# Patient Record
Sex: FEMALE | Race: WHITE | NOT HISPANIC OR LATINO | Employment: FULL TIME | URBAN - METROPOLITAN AREA
[De-identification: names, ages, dates, MRNs, and addresses within clinical notes are randomized per-mention and may not be internally consistent; named-entity substitution may affect disease eponyms.]

---

## 2017-02-23 RX ORDER — PANTOPRAZOLE SODIUM 40 MG/1
TABLET, DELAYED RELEASE ORAL
Qty: 30 TABLET | Refills: 11 | Status: SHIPPED | OUTPATIENT
Start: 2017-02-23 | End: 2018-02-26 | Stop reason: SDUPTHER

## 2018-02-27 RX ORDER — PANTOPRAZOLE SODIUM 40 MG/1
TABLET, DELAYED RELEASE ORAL
Qty: 90 TABLET | Refills: 3 | Status: SHIPPED | OUTPATIENT
Start: 2018-02-27 | End: 2018-11-12 | Stop reason: SDUPTHER

## 2018-11-12 RX ORDER — PANTOPRAZOLE SODIUM 40 MG/1
TABLET, DELAYED RELEASE ORAL
Qty: 90 TABLET | Refills: 3 | Status: SHIPPED | OUTPATIENT
Start: 2018-11-12 | End: 2019-11-25 | Stop reason: SDUPTHER

## 2018-12-10 ENCOUNTER — TELEPHONE (OUTPATIENT)
Dept: GASTROENTEROLOGY | Facility: CLINIC | Age: 63
End: 2018-12-10

## 2018-12-10 NOTE — TELEPHONE ENCOUNTER
----- Message from Leah Randolph sent at 12/10/2018  8:33 AM CST -----  Contact: pt  ..Type: Needs Medical Advice    Who Called: pt  Best Call Back Number:   Additional Information: Pt would like to speak with a nurse in regards to her past  Bladder. surgery, need to know dr's name that did surgery.  Please call to advise  Thanks

## 2019-12-01 RX ORDER — PANTOPRAZOLE SODIUM 40 MG/1
TABLET, DELAYED RELEASE ORAL
Qty: 90 TABLET | Refills: 3 | Status: SHIPPED | OUTPATIENT
Start: 2019-12-01 | End: 2020-08-24

## 2020-03-15 ENCOUNTER — NURSE TRIAGE (OUTPATIENT)
Dept: ADMINISTRATIVE | Facility: CLINIC | Age: 65
End: 2020-03-15

## 2020-03-16 NOTE — TELEPHONE ENCOUNTER
Patient attempted [facility] but after holding on for more than and hour she states the doctor stop taking calls and the line disconnected. She would like if Malick Og would please call her in the AM because the medications are not working    Reason for Disposition   [1] Follow-up call to recent contact AND [2] information only call, no triage required    Additional Information   Negative: [1] Caller is not with the adult (patient) AND [2] reporting urgent symptoms   Negative: Lab result questions   Negative: Medication questions   Negative: Caller can't be reached by phone   Negative: Caller has already spoken to PCP or another triager   Negative: RN needs further essential information from caller in order to complete triage   Negative: Requesting regular office appointment   Negative: [1] Caller requesting NON-URGENT health information AND [2] PCP's office is the best resource   Negative: Health Information question, no triage required and triager able to answer question   Negative: General information question, no triage required and triager able to answer question   Negative: Question about upcoming scheduled test, no triage required and triager able to answer question   Negative: [1] Caller is not with the adult (patient) AND [2] probable NON-URGENT symptoms    Protocols used: INFORMATION ONLY CALL-A-

## 2020-03-16 NOTE — TELEPHONE ENCOUNTER
"Pt calling, states she was recently tested for Mono on Wednesday and has since gotten much worse. Her results for her EB virus test were released to her on the portal but no one called her to discuss. Pt is calling stating that she has 8/10 headache that is getting worse each day and is only mildly controlled with Fioricet. Per triage protocol, advised that she use [facility] within 4 hours. Daughter and patient verbalized understanding.    Reason for Disposition   [1] SEVERE headache (e.g., excruciating) AND [2] not improved after 2 hours of pain medicine    Additional Information   Negative: Difficult to awaken or acting confused (e.g., disoriented, slurred speech)   Negative: [1] Weakness of the face, arm or leg on one side of the body AND [2] new onset   Negative: [1] Numbness of the face, arm or leg on one side of the body AND [2] new onset   Negative: [1] Loss of speech or garbled speech AND [2] new onset   Negative: Passed out (i.e., lost consciousness, collapsed and was not responding)   Negative: Stiff neck (can't touch chin to chest)   Negative: Unable to walk, or can only walk with assistance (e.g., requires support)   Negative: Severe pain in one eye   Negative: [1] Other family members (or roommates) with headaches AND [2] possibility of carbon monoxide exposure   Negative: [1] SEVERE headache (e.g., excruciating) AND [2] "worst headache" of life   Negative: [1] SEVERE headache AND [2] sudden-onset (i.e., reaching maximum intensity within seconds)   Negative: [1] SEVERE headache AND [2] fever   Negative: Loss of vision or double vision (Exception: same as prior migraines)   Negative: [1] Fever > 100.0 F (37.8 C) AND [2] diabetes mellitus or weak immune system (e.g., HIV positive, cancer chemo, splenectomy, chronic steroids)   Negative: Patient sounds very sick or weak to the triager    Protocols used: HEADACHE-A-AH      "

## 2020-05-03 ENCOUNTER — OFFICE VISIT (OUTPATIENT)
Dept: URGENT CARE | Facility: CLINIC | Age: 65
End: 2020-05-03
Payer: COMMERCIAL

## 2020-05-03 ENCOUNTER — NURSE TRIAGE (OUTPATIENT)
Dept: ADMINISTRATIVE | Facility: CLINIC | Age: 65
End: 2020-05-03

## 2020-05-03 VITALS — TEMPERATURE: 97 F | HEART RATE: 77 BPM | OXYGEN SATURATION: 98 %

## 2020-05-03 DIAGNOSIS — Z20.822 EXPOSURE TO COVID-19 VIRUS: Primary | ICD-10-CM

## 2020-05-03 DIAGNOSIS — R50.9 FEVER, UNSPECIFIED FEVER CAUSE: ICD-10-CM

## 2020-05-03 PROCEDURE — 99214 PR OFFICE/OUTPT VISIT, EST, LEVL IV, 30-39 MIN: ICD-10-PCS | Mod: S$GLB,,, | Performed by: NURSE PRACTITIONER

## 2020-05-03 PROCEDURE — 99214 OFFICE O/P EST MOD 30 MIN: CPT | Mod: S$GLB,,, | Performed by: NURSE PRACTITIONER

## 2020-05-03 PROCEDURE — U0002 COVID-19 LAB TEST NON-CDC: HCPCS

## 2020-05-03 NOTE — TELEPHONE ENCOUNTER
Huyen is wanting a test for covid.  Her family has been exposed and she has symptoms  Heaviness in chest joint pain and low grade temp  She has been taking tylenol

## 2020-05-03 NOTE — TELEPHONE ENCOUNTER
Additional Information   Negative: [1] Fever (or feeling feverish) OR cough AND [2] within 14 Days of COVID-19 EXPOSURE (Close Contact)   Negative: [1] Fever (or feeling feverish) OR cough occurs AND [2] within 14 days of travel from another country (international travel)   Negative: [1] Fever (or feeling feverish) OR cough occurs AND [2] within 14 days of travel from a city or area with major community spread   Negative: [1] Fever (or feeling feverish) OR cough occurs AND [2] living in area with major community spread AND [3] testing being done in the community for symptoms   Negative: [1] Mild body aches, chills, diarrhea, headache, runny nose, or sore throat AND [2] within 14 days of COVID-19 EXPOSURE   Negative: [1] COVID-19 EXPOSURE within last 14 days AND [2] NO cough, fever, or breathing difficulty AND [3] exposed person is a healthcare worker who was NOT using all recommended personal protective equipment (i.e., a respirator-N95 mask, eye protection, gloves, and gown)    Protocols used: CORONAVIRUS (COVID-19) EXPOSURE-A-

## 2020-05-03 NOTE — PATIENT INSTRUCTIONS
We will call with results.  Continue self quarantine until results and based on symptoms per cdc guidelines

## 2020-05-03 NOTE — TELEPHONE ENCOUNTER
Patient wants to be tested with her grandson, they are obtaining order for him, she was tested in March and was negative, Virtual appt offered as she wants to go now with him, unsure if the testing site her grandson will go to will test all family members,

## 2020-05-03 NOTE — TELEPHONE ENCOUNTER
At urgent care and has been exposed      Reason for Disposition   [1] Fever (or feeling feverish) OR cough AND [2] within 14 Days of COVID-19 EXPOSURE (Close Contact)    Additional Information   Negative: SEVERE difficulty breathing (e.g., struggling for each breath, speaks in single words)   Negative: Bluish (or gray) lips or face now   Negative: Sounds like a life-threatening emergency to the triager   Negative: [1] Difficulty breathing occurs AND [2] within 14 days of COVID-19 EXPOSURE (Close Contact)   Negative: Patient sounds very sick or weak to the triager    Protocols used: CORONAVIRUS (COVID-19) EXPOSURE-A-AH

## 2020-05-03 NOTE — PROGRESS NOTES
Subjective:       Patient ID: Huyen Gracia is a 64 y.o. female.    Vitals:  temperature is 97.1 °F (36.2 °C). Her pulse is 77. Her oxygen saturation is 98%.     Chief Complaint: Fever    Symptoms began last week.   Pt took tylenol and fiorecet for symptoms for the past week.  Reports back of neck headache, joint pain, heavy chest/sob at times - denies coughing, denies asthma, copd. Denies fevers at this time. Reports tolerating food and drink. Reports working outside/cutting grass in the heat. Daughter and grandson with viral symptoms and daughter working outside house and positive exposure to covid.     Cough   This is a new problem. The current episode started in the past 7 days. The problem has been gradually worsening. The problem occurs every few minutes. The cough is non-productive. Associated symptoms include headaches, myalgias, a sore throat and shortness of breath. Pertinent negatives include no chest pain, chills, ear congestion, ear pain, eye redness, fever, heartburn, hemoptysis, nasal congestion, postnasal drip, rash, rhinorrhea, sweats, weight loss or wheezing. Nothing aggravates the symptoms. She has tried nothing for the symptoms. The treatment provided no relief. There is no history of asthma, bronchiectasis, bronchitis, COPD, emphysema, environmental allergies or pneumonia.       Constitution: Positive for fatigue. Negative for chills, sweating and fever.   HENT: Positive for sore throat. Negative for ear pain, postnasal drip, sinus pain, sinus pressure and voice change.    Neck: Positive for neck stiffness. Negative for painful lymph nodes.   Cardiovascular: Negative for chest pain.   Eyes: Negative for eye redness.   Respiratory: Positive for chest tightness, cough and shortness of breath. Negative for sputum production, bloody sputum, COPD, stridor, wheezing and asthma.    Gastrointestinal: Negative for heartburn.   Musculoskeletal: Positive for muscle ache.   Skin: Negative for rash.    Allergic/Immunologic: Negative for environmental allergies, seasonal allergies and asthma.   Neurological: Positive for dizziness and headaches.   Hematologic/Lymphatic: Negative for swollen lymph nodes.       Objective:      Physical Exam    Patient was seen remotely due to state of emergency for the COVID-19 outbreak.    Assessment:       1. Exposure to Covid-19 Virus    2. Fever, unspecified fever cause        Plan:     discussed rebound headaches - avoid fioricet. Tylenol for headache, rest/hydrate.   Meets criteria for covid 19 testing- subjective fever, exposure to covid19.   Self quarantine until results and based on cdc guidelines.  ER precautions.    Exposure to Covid-19 Virus  -     COVID-19 Routine Screening    Fever, unspecified fever cause  -     COVID-19 Routine Screening      Patient Instructions   We will call with results.  Continue self quarantine until results and based on symptoms per cdc guidelines

## 2020-05-03 NOTE — TELEPHONE ENCOUNTER
Reason for Disposition   [1] COVID-19 EXPOSURE (Close Contact) within last 14 days AND [2] NO cough, fever, or breathing difficulty    Additional Information   Negative: SEVERE difficulty breathing (e.g., struggling for each breath, speaks in single words)   Negative: Bluish (or gray) lips or face now   Negative: Sounds like a life-threatening emergency to the triager   Negative: [1] Adult has symptoms of COVID-19 (fever, cough, or SOB) AND [2] lab test positive   Negative: [1] Adult has symptoms of COVID-19 (fever, cough or SOB) AND [2] major community spread where patient lives AND [3] testing not being done for mild symptoms   Negative: [1] Difficulty breathing (shortness of breath) occurs AND [2] onset > 14 days after COVID-19 EXPOSURE (Close Contact) AND [3] no major community spread   Negative: [1] Dry cough occurs AND [2] onset > 14 days after COVID-19 EXPOSURE AND [3] no major community spread   Negative: [1] Wet cough (i.e., white-yellow, yellow, green, or mali colored sputum) AND [2] onset > 14 days after COVID-19 EXPOSURE AND [3] no major community spread   Negative: [1] Common cold symptoms AND [2] onset > 14 days after COVID-19 EXPOSURE AND [3] no major community spread   Negative: [1] Difficulty breathing occurs AND [2] within 14 days of COVID-19 EXPOSURE (Close Contact)   Negative: Patient sounds very sick or weak to the triager    Protocols used: [facility]

## 2020-05-05 ENCOUNTER — TELEPHONE (OUTPATIENT)
Dept: URGENT CARE | Facility: CLINIC | Age: 65
End: 2020-05-05

## 2020-05-05 LAB — SARS-COV-2 RNA RESP QL NAA+PROBE: NOT DETECTED

## 2020-05-05 NOTE — TELEPHONE ENCOUNTER
Spoke with patient's daughter regarding negative Covid-19 testing. The patient is feeling much better. Discussed to practice safe social distancing and good hand hygiene. They did not contract the coronavirus at this point however that does not mean they are not susceptible to it in the future. Patient's daughter verbalized understanding and all of their questions were answered.

## 2021-04-28 PROBLEM — R00.1 BRADYCARDIA: Status: ACTIVE | Noted: 2021-04-28

## 2021-05-10 ENCOUNTER — PATIENT MESSAGE (OUTPATIENT)
Dept: RESEARCH | Facility: HOSPITAL | Age: 66
End: 2021-05-10

## 2022-01-11 ENCOUNTER — OFFICE VISIT (OUTPATIENT)
Dept: FAMILY MEDICINE | Facility: CLINIC | Age: 67
End: 2022-01-11
Payer: COMMERCIAL

## 2022-01-11 VITALS
DIASTOLIC BLOOD PRESSURE: 70 MMHG | TEMPERATURE: 98 F | BODY MASS INDEX: 39 KG/M2 | HEART RATE: 66 BPM | SYSTOLIC BLOOD PRESSURE: 118 MMHG | HEIGHT: 59 IN | WEIGHT: 193.44 LBS | OXYGEN SATURATION: 98 %

## 2022-01-11 DIAGNOSIS — Z11.4 SCREENING FOR HIV WITHOUT PRESENCE OF RISK FACTORS: ICD-10-CM

## 2022-01-11 DIAGNOSIS — I10 PRIMARY HYPERTENSION: Primary | ICD-10-CM

## 2022-01-11 DIAGNOSIS — Z11.59 ENCOUNTER FOR HEPATITIS C SCREENING TEST FOR LOW RISK PATIENT: ICD-10-CM

## 2022-01-11 LAB
ALBUMIN SERPL BCP-MCNC: 3.8 G/DL (ref 3.5–5.2)
ALP SERPL-CCNC: 156 U/L (ref 55–135)
ALT SERPL W/O P-5'-P-CCNC: 13 U/L (ref 10–44)
ANION GAP SERPL CALC-SCNC: 11 MMOL/L (ref 8–16)
AST SERPL-CCNC: 17 U/L (ref 10–40)
BASOPHILS # BLD AUTO: 0.04 K/UL (ref 0–0.2)
BASOPHILS NFR BLD: 0.5 % (ref 0–1.9)
BILIRUB SERPL-MCNC: 0.3 MG/DL (ref 0.1–1)
BUN SERPL-MCNC: 12 MG/DL (ref 8–23)
CALCIUM SERPL-MCNC: 9.8 MG/DL (ref 8.7–10.5)
CHLORIDE SERPL-SCNC: 102 MMOL/L (ref 95–110)
CHOLEST SERPL-MCNC: 201 MG/DL (ref 120–199)
CHOLEST/HDLC SERPL: 4.4 {RATIO} (ref 2–5)
CO2 SERPL-SCNC: 24 MMOL/L (ref 23–29)
CREAT SERPL-MCNC: 0.9 MG/DL (ref 0.5–1.4)
DIFFERENTIAL METHOD: ABNORMAL
EOSINOPHIL # BLD AUTO: 0.3 K/UL (ref 0–0.5)
EOSINOPHIL NFR BLD: 3.2 % (ref 0–8)
ERYTHROCYTE [DISTWIDTH] IN BLOOD BY AUTOMATED COUNT: 13.4 % (ref 11.5–14.5)
EST. GFR  (AFRICAN AMERICAN): >60 ML/MIN/1.73 M^2
EST. GFR  (NON AFRICAN AMERICAN): >60 ML/MIN/1.73 M^2
GLUCOSE SERPL-MCNC: 96 MG/DL (ref 70–110)
HCT VFR BLD AUTO: 40.5 % (ref 37–48.5)
HDLC SERPL-MCNC: 46 MG/DL (ref 40–75)
HDLC SERPL: 22.9 % (ref 20–50)
HGB BLD-MCNC: 13 G/DL (ref 12–16)
IMM GRANULOCYTES # BLD AUTO: 0.02 K/UL (ref 0–0.04)
IMM GRANULOCYTES NFR BLD AUTO: 0.2 % (ref 0–0.5)
LDLC SERPL CALC-MCNC: 113 MG/DL (ref 63–159)
LYMPHOCYTES # BLD AUTO: 2.6 K/UL (ref 1–4.8)
LYMPHOCYTES NFR BLD: 31.6 % (ref 18–48)
MCH RBC QN AUTO: 26.9 PG (ref 27–31)
MCHC RBC AUTO-ENTMCNC: 32.1 G/DL (ref 32–36)
MCV RBC AUTO: 84 FL (ref 82–98)
MONOCYTES # BLD AUTO: 0.5 K/UL (ref 0.3–1)
MONOCYTES NFR BLD: 5.9 % (ref 4–15)
NEUTROPHILS # BLD AUTO: 4.9 K/UL (ref 1.8–7.7)
NEUTROPHILS NFR BLD: 58.6 % (ref 38–73)
NONHDLC SERPL-MCNC: 155 MG/DL
NRBC BLD-RTO: 0 /100 WBC
PLATELET # BLD AUTO: 342 K/UL (ref 150–450)
PMV BLD AUTO: 11.1 FL (ref 9.2–12.9)
POTASSIUM SERPL-SCNC: 4 MMOL/L (ref 3.5–5.1)
PROT SERPL-MCNC: 7.3 G/DL (ref 6–8.4)
RBC # BLD AUTO: 4.84 M/UL (ref 4–5.4)
SODIUM SERPL-SCNC: 137 MMOL/L (ref 136–145)
TRIGL SERPL-MCNC: 210 MG/DL (ref 30–150)
WBC # BLD AUTO: 8.33 K/UL (ref 3.9–12.7)

## 2022-01-11 PROCEDURE — 3008F PR BODY MASS INDEX (BMI) DOCUMENTED: ICD-10-PCS | Mod: CPTII,S$GLB,, | Performed by: PHYSICIAN ASSISTANT

## 2022-01-11 PROCEDURE — 87389 HIV-1 AG W/HIV-1&-2 AB AG IA: CPT | Performed by: PHYSICIAN ASSISTANT

## 2022-01-11 PROCEDURE — 99214 OFFICE O/P EST MOD 30 MIN: CPT | Mod: 25,S$GLB,, | Performed by: PHYSICIAN ASSISTANT

## 2022-01-11 PROCEDURE — 90686 FLU VACCINE (QUAD) GREATER THAN OR EQUAL TO 3YO PRESERVATIVE FREE IM: ICD-10-PCS | Mod: S$GLB,,, | Performed by: PHYSICIAN ASSISTANT

## 2022-01-11 PROCEDURE — 80061 LIPID PANEL: CPT | Performed by: PHYSICIAN ASSISTANT

## 2022-01-11 PROCEDURE — 85025 COMPLETE CBC W/AUTO DIFF WBC: CPT | Performed by: PHYSICIAN ASSISTANT

## 2022-01-11 PROCEDURE — 3074F PR MOST RECENT SYSTOLIC BLOOD PRESSURE < 130 MM HG: ICD-10-PCS | Mod: CPTII,S$GLB,, | Performed by: PHYSICIAN ASSISTANT

## 2022-01-11 PROCEDURE — 1159F PR MEDICATION LIST DOCUMENTED IN MEDICAL RECORD: ICD-10-PCS | Mod: CPTII,S$GLB,, | Performed by: PHYSICIAN ASSISTANT

## 2022-01-11 PROCEDURE — 3078F PR MOST RECENT DIASTOLIC BLOOD PRESSURE < 80 MM HG: ICD-10-PCS | Mod: CPTII,S$GLB,, | Performed by: PHYSICIAN ASSISTANT

## 2022-01-11 PROCEDURE — 3074F SYST BP LT 130 MM HG: CPT | Mod: CPTII,S$GLB,, | Performed by: PHYSICIAN ASSISTANT

## 2022-01-11 PROCEDURE — 90471 IMMUNIZATION ADMIN: CPT | Mod: S$GLB,,, | Performed by: PHYSICIAN ASSISTANT

## 2022-01-11 PROCEDURE — 86803 HEPATITIS C AB TEST: CPT | Performed by: PHYSICIAN ASSISTANT

## 2022-01-11 PROCEDURE — 1160F PR REVIEW ALL MEDS BY PRESCRIBER/CLIN PHARMACIST DOCUMENTED: ICD-10-PCS | Mod: CPTII,S$GLB,, | Performed by: PHYSICIAN ASSISTANT

## 2022-01-11 PROCEDURE — 90471 FLU VACCINE (QUAD) GREATER THAN OR EQUAL TO 3YO PRESERVATIVE FREE IM: ICD-10-PCS | Mod: S$GLB,,, | Performed by: PHYSICIAN ASSISTANT

## 2022-01-11 PROCEDURE — 80053 COMPREHEN METABOLIC PANEL: CPT | Performed by: PHYSICIAN ASSISTANT

## 2022-01-11 PROCEDURE — 1160F RVW MEDS BY RX/DR IN RCRD: CPT | Mod: CPTII,S$GLB,, | Performed by: PHYSICIAN ASSISTANT

## 2022-01-11 PROCEDURE — 3078F DIAST BP <80 MM HG: CPT | Mod: CPTII,S$GLB,, | Performed by: PHYSICIAN ASSISTANT

## 2022-01-11 PROCEDURE — 36415 COLL VENOUS BLD VENIPUNCTURE: CPT | Performed by: PHYSICIAN ASSISTANT

## 2022-01-11 PROCEDURE — 99214 PR OFFICE/OUTPT VISIT, EST, LEVL IV, 30-39 MIN: ICD-10-PCS | Mod: 25,S$GLB,, | Performed by: PHYSICIAN ASSISTANT

## 2022-01-11 PROCEDURE — 1159F MED LIST DOCD IN RCRD: CPT | Mod: CPTII,S$GLB,, | Performed by: PHYSICIAN ASSISTANT

## 2022-01-11 PROCEDURE — 84443 ASSAY THYROID STIM HORMONE: CPT | Performed by: PHYSICIAN ASSISTANT

## 2022-01-11 PROCEDURE — 90686 IIV4 VACC NO PRSV 0.5 ML IM: CPT | Mod: S$GLB,,, | Performed by: PHYSICIAN ASSISTANT

## 2022-01-11 PROCEDURE — 3008F BODY MASS INDEX DOCD: CPT | Mod: CPTII,S$GLB,, | Performed by: PHYSICIAN ASSISTANT

## 2022-01-11 RX ORDER — MELOXICAM 7.5 MG/1
7.5 TABLET ORAL DAILY
COMMUNITY

## 2022-01-11 RX ORDER — CYCLOBENZAPRINE HCL 10 MG
10 TABLET ORAL 3 TIMES DAILY PRN
COMMUNITY

## 2022-01-11 RX ORDER — BACLOFEN 10 MG/1
10 TABLET ORAL 3 TIMES DAILY PRN
COMMUNITY

## 2022-01-11 NOTE — PROGRESS NOTES
"Subjective:       Patient ID: Huyen Gracia is a 66 y.o. female.    Chief Complaint: Establish Care    Patient is a 67 yo female coming in to establish care with me. She voices no acute complaints.     Patient Active Problem List:     Dysphagia     GERD (gastroesophageal reflux disease)     HBP (high blood pressure)     Bradycardia  CONDITIONS ARE STABLE ON MEDICATION.     Past Medical History:  No date: Dysphagia  No date: General anesthetics causing adverse effect in therapeutic use      Comment:  No Versed/ Pt states "fights" going in and out  No date: GERD (gastroesophageal reflux disease)  No date: History of 2019 novel coronavirus disease (COVID-19)  No date: Hypertension  No date: Lumbar disc disease    Past Surgical History:  No date: APPENDECTOMY  No date: BACK SURGERY      Comment:  "shaved" herniated discs  No date: BACK SURGERY      Comment:  lumber fusion  No date: CHOLECYSTECTOMY  No date: COLONOSCOPY  6/25/2012  [facility]: COLONOSCOPY W/ POLYPECTOMY      Comment:  One 1 mm polyp at the hepatic flexure.  TUBULAR                ADENOMATOUS POLYP.  The entire examined colon is                otherwise normal.  No date: HYSTERECTOMY  No date: KIDNEY SURGERY      Comment:  Stent   No date: TUBAL LIGATION  6/24/2008  Zac: UPPER GASTROINTESTINAL ENDOSCOPY      Comment:  LA Grade A reflux esophagitis.  Esophagus dilated - 51                Fr.  Minimal antritis.  TRAVIS Test  Negative.   6/25/2012  Z[facility]: UPPER GASTROINTESTINAL ENDOSCOPY      Comment:  Normal esophagus.  Normal stomach and duodenum.  TRAVIS                Test  Negative.    No family history on file.      Social History    Socioeconomic History      Marital status: Single    Tobacco Use      Smoking status: Never Smoker      Smokeless tobacco: Never Used    Substance and Sexual Activity      Alcohol use: Yes        Alcohol/week: 1.0 standard drink        Types: 1 Glasses of wine per week      Drug use: No      Review of patient's allergies " indicates:   -- Morphine -- Other (See Comments)    --  Hallucinations   -- Penicillins -- Hives   -- Sulfa (sulfonamide antibiotics) -- Hives    Current Outpatient Medications:   baclofen (LIORESAL) 10 MG tablet, Take 10 mg by mouth 3 (three) times daily., Disp: , Rfl:   butalbital-acetaminophen-caffeine -40 mg (FIORICET, ESGIC) -40 mg per tablet, TAKE 1 TABLET BY MOUTH 3 TIMES A DAY AS NEEDED **MUST LAST 30 DAYS**, Disp: 60 tablet, Rfl: 2  cetirizine (ZYRTEC) 10 MG tablet, Take 10 mg by mouth once daily., Disp: , Rfl:   cloNIDine (CATAPRES) 0.1 MG tablet, Take 1 tablet by mouth every evening., Disp: 30 tablet, Rfl: 11  cyclobenzaprine (FLEXERIL) 10 MG tablet, Take 10 mg by mouth 3 (three) times daily as needed for Muscle spasms., Disp: , Rfl:   dicyclomine (BENTYL) 20 mg tablet, Take 1 tablet by mouth every 6 hours., Disp: 120 tablet, Rfl: 3  diphenhydrAMINE (BENADRYL) 50 MG capsule, Take 50 mg by mouth nightly as needed for Insomnia., Disp: , Rfl:   diphenoxylate-atropine 2.5-0.025 mg (LOMOTIL) 2.5-0.025 mg per tablet, TAKE ONE TABLET BY MOUTH FOUR TIMES DAILY AS NEEDED FOR DIARRHEA, Disp: 28 tablet, Rfl: 1  enalapril (VASOTEC) 2.5 MG tablet, TAKE 1 TABLET (2.5 MG TOTAL) BY MOUTH ONCE DAILY., Disp: 90 tablet, Rfl: 3  estradiol (ESTRACE) 1 MG tablet, Take 2 mg by mouth once daily., Disp: , Rfl: 11  felodipine (PLENDIL) 10 MG 24 hr tablet, Take 1 tablet (10 mg total) by mouth once daily., Disp: 30 tablet, Rfl: 11  fluocinonide 0.05% (LIDEX) 0.05 % cream, Apply topically 2 (two) times daily., Disp: 15 g, Rfl: 1  gabapentin (NEURONTIN) 300 MG capsule, Take 600 mg by mouth 3 (three) times daily., Disp: , Rfl:   hyoscyamine (LEVSIN/SL) 0.125 mg Subl, Dissolve 1 to 2 tablets under tongue (or chew 2) every three to four hours as needed to relieve esophagus spasms., Disp: 30 tablet, Rfl: 6  melatonin 10 mg Tab, Take 1 tablet by mouth nightly., Disp: , Rfl:   meloxicam (MOBIC) 7.5 MG tablet,  "Take 7.5 mg by mouth once daily., Disp: , Rfl:   neomycin-polymyxin-hydrocortisone (CORTISPORIN) otic solution, Place 3 drops into the right ear 3 (three) times daily., Disp: 1 Bottle, Rfl: 0  ondansetron (ZOFRAN) 4 MG tablet, TAKE 1 TABLET (4 MG TOTAL) BY MOUTH EVERY 6 (SIX) HOURS AS NEEDED FOR NAUSEA., Disp: 30 tablet, Rfl: 2  pantoprazole (PROTONIX) 40 MG tablet, TAKE 1 TABLET BY MOUTH BEFORE BREAKFAST., Disp: 90 tablet, Rfl: 3  traMADol (ULTRAM) 50 mg tablet, TAKE 1 TABLET BY MOUTH 3 TIMES A DAY AS NEEDED FOR PAIN, Disp: 60 tablet, Rfl: 2  triamcinolone acetonide 0.1% (KENALOG) 0.1 % ointment, Apply topically 2 (two) times daily., Disp: 45 g, Rfl: 3  triamterene-hydrochlorothiazide 37.5-25 mg (DYAZIDE) 37.5-25 mg per capsule, TAKE 1 CAPSULE BY MOUTH EVERY MORNING., Disp: 30 capsule, Rfl: 11    /70   Pulse 66   Temp 97.5 °F (36.4 °C) (Oral)   Ht 4' 11" (1.499 m)   Wt 87.8 kg (193 lb 7.3 oz)   SpO2 98%   BMI 39.07 kg/m²       Review of Systems   Constitutional: Negative for activity change, appetite change, fatigue and fever.   HENT: Negative.    Respiratory: Negative for cough, chest tightness, shortness of breath and wheezing.    Cardiovascular: Negative for chest pain, palpitations and leg swelling.   Gastrointestinal: Negative for abdominal pain, blood in stool, diarrhea and nausea.   Genitourinary: Negative for hematuria and urgency.   Musculoskeletal: Positive for back pain.   Integumentary:  Negative for breast mass.   Neurological: Negative for dizziness, vertigo, seizures and light-headedness.   Breast: Negative for mass        Objective:      Physical Exam  Constitutional:       General: She is not in acute distress.     Appearance: Normal appearance. She is not ill-appearing, toxic-appearing or diaphoretic.   HENT:      Head: Normocephalic and atraumatic.      Right Ear: Tympanic membrane, ear canal and external ear normal. There is no impacted cerumen.      Left Ear: Tympanic " membrane, ear canal and external ear normal. There is no impacted cerumen.      Nose: Nose normal.      Mouth/Throat:      Mouth: Mucous membranes are moist.      Pharynx: No oropharyngeal exudate or posterior oropharyngeal erythema.   Eyes:      Conjunctiva/sclera: Conjunctivae normal.      Pupils: Pupils are equal, round, and reactive to light.   Neck:      Vascular: No carotid bruit.   Cardiovascular:      Rate and Rhythm: Normal rate and regular rhythm.      Pulses: Normal pulses.      Heart sounds: Normal heart sounds. No murmur heard.  No friction rub. No gallop.    Pulmonary:      Effort: Pulmonary effort is normal. No respiratory distress.      Breath sounds: Normal breath sounds. No stridor. No wheezing, rhonchi or rales.   Chest:      Chest wall: No tenderness.   Abdominal:      General: There is no distension.      Palpations: There is no mass.      Tenderness: There is no abdominal tenderness. There is no right CVA tenderness, left CVA tenderness, guarding or rebound.      Hernia: No hernia is present.   Musculoskeletal:         General: No swelling or tenderness. Normal range of motion.      Cervical back: No rigidity or tenderness.   Lymphadenopathy:      Cervical: No cervical adenopathy.   Skin:     General: Skin is warm and dry.   Neurological:      Mental Status: She is alert.   Psychiatric:         Mood and Affect: Mood normal.         Lab Results   Component Value Date    WBC 13.44 (H) 05/22/2021    HGB 13.2 05/22/2021    HCT 40.8 05/22/2021    MCV 85 05/22/2021     05/22/2021     CMP  Sodium   Date Value Ref Range Status   05/22/2021 139 136 - 145 mmol/L Final     Potassium   Date Value Ref Range Status   05/22/2021 3.5 3.5 - 5.1 mmol/L Final     Chloride   Date Value Ref Range Status   05/22/2021 101 95 - 110 mmol/L Final     CO2   Date Value Ref Range Status   05/22/2021 27 22 - 31 mmol/L Final     Glucose   Date Value Ref Range Status   05/22/2021 99 70 - 110 mg/dL Final     Comment:      The ADA recommends the following guidelines for fasting glucose:    Normal:       less than 100 mg/dL    Prediabetes:  100 mg/dL to 125 mg/dL    Diabetes:     126 mg/dL or higher       BUN   Date Value Ref Range Status   05/22/2021 12 7 - 18 mg/dL Final     Creatinine   Date Value Ref Range Status   05/22/2021 0.69 0.50 - 1.40 mg/dL Final     Calcium   Date Value Ref Range Status   05/22/2021 9.7 8.4 - 10.2 mg/dL Final     Total Protein   Date Value Ref Range Status   05/22/2021 7.7 6.0 - 8.4 g/dL Final     Albumin   Date Value Ref Range Status   05/22/2021 4.2 3.5 - 5.2 g/dL Final     Total Bilirubin   Date Value Ref Range Status   05/22/2021 0.3 0.2 - 1.3 mg/dL Final     Alkaline Phosphatase   Date Value Ref Range Status   05/22/2021 140 38 - 145 U/L Final     AST   Date Value Ref Range Status   05/22/2021 27 14 - 36 U/L Final     ALT   Date Value Ref Range Status   05/22/2021 17 0 - 35 U/L Final     Anion Gap   Date Value Ref Range Status   05/22/2021 11 8 - 16 mmol/L Final     eGFR if    Date Value Ref Range Status   05/22/2021 >60 >60 mL/min/1.73 m^2 Final     eGFR if non    Date Value Ref Range Status   05/22/2021 >60 >60 mL/min/1.73 m^2 Final     Comment:     Calculation used to obtain the estimated glomerular filtration  rate (eGFR) is the CKD-EPI equation.        Lab Results   Component Value Date    CHOL 178 03/11/2020     Lab Results   Component Value Date    HDL 52 03/11/2020     Lab Results   Component Value Date    LDLCALC 80.0 03/11/2020     Lab Results   Component Value Date    TRIG 230 (H) 03/11/2020     Lab Results   Component Value Date    CHOLHDL 29.2 03/11/2020     No results found for: LABA1C, HGBA1C  Assessment:       Problem List Items Addressed This Visit     HBP (high blood pressure) - Primary    Relevant Orders    Comprehensive Metabolic Panel    CBC Auto Differential    Lipid Panel    TSH      Other Visit Diagnoses     Screening for HIV without presence of  risk factors        Relevant Orders    HIV 1/2 Ag/Ab (4th Gen)    Encounter for hepatitis C screening test for low risk patient        Relevant Orders    Hepatitis C Antibody          Plan:       Primary hypertension  -     Comprehensive Metabolic Panel; Future; Expected date: 01/11/2022  -     CBC Auto Differential; Future; Expected date: 01/11/2022  -     Lipid Panel; Future; Expected date: 01/11/2022  -     TSH; Future; Expected date: 01/11/2022    Screening for HIV without presence of risk factors  -     HIV 1/2 Ag/Ab (4th Gen); Future; Expected date: 01/11/2022    Encounter for hepatitis C screening test for low risk patient  -     Hepatitis C Antibody; Future; Expected date: 01/11/2022    Other orders  -     Influenza - Quadrivalent (PF)

## 2022-01-12 LAB
HCV AB SERPL QL IA: NEGATIVE
HIV 1+2 AB+HIV1 P24 AG SERPL QL IA: NEGATIVE
TSH SERPL DL<=0.005 MIU/L-ACNC: 1.43 UIU/ML (ref 0.4–4)

## 2022-03-07 DIAGNOSIS — K58.9 IRRITABLE BOWEL SYNDROME WITHOUT DIARRHEA: ICD-10-CM

## 2022-03-07 RX ORDER — DICYCLOMINE HYDROCHLORIDE 20 MG/1
20 TABLET ORAL EVERY 6 HOURS
Qty: 120 TABLET | Refills: 3 | Status: SHIPPED | OUTPATIENT
Start: 2022-03-07 | End: 2023-02-27

## 2022-03-07 NOTE — TELEPHONE ENCOUNTER
Patient Requesting Refill  LOV: 1/11/22   Last fill date: 2/5/22 #30  Allergies reviewed  Medication Pended

## 2022-04-05 DIAGNOSIS — K52.9 GE (GASTROENTERITIS): ICD-10-CM

## 2022-04-05 RX ORDER — DIPHENOXYLATE HYDROCHLORIDE AND ATROPINE SULFATE 2.5; .025 MG/1; MG/1
1 TABLET ORAL 4 TIMES DAILY PRN
Qty: 45 TABLET | Refills: 1 | Status: SHIPPED | OUTPATIENT
Start: 2022-04-05 | End: 2022-06-28

## 2022-05-09 ENCOUNTER — OFFICE VISIT (OUTPATIENT)
Dept: FAMILY MEDICINE | Facility: CLINIC | Age: 67
End: 2022-05-09
Payer: COMMERCIAL

## 2022-05-09 VITALS
OXYGEN SATURATION: 98 % | WEIGHT: 193.44 LBS | HEIGHT: 59 IN | SYSTOLIC BLOOD PRESSURE: 134 MMHG | DIASTOLIC BLOOD PRESSURE: 66 MMHG | BODY MASS INDEX: 39 KG/M2 | TEMPERATURE: 98 F | HEART RATE: 78 BPM

## 2022-05-09 DIAGNOSIS — R60.0 PEDAL EDEMA: Primary | ICD-10-CM

## 2022-05-09 DIAGNOSIS — R21 RASH AND NONSPECIFIC SKIN ERUPTION: ICD-10-CM

## 2022-05-09 LAB
ALBUMIN SERPL BCP-MCNC: 3.6 G/DL (ref 3.5–5.2)
ALP SERPL-CCNC: 138 U/L (ref 55–135)
ALT SERPL W/O P-5'-P-CCNC: 9 U/L (ref 10–44)
ANION GAP SERPL CALC-SCNC: 9 MMOL/L (ref 8–16)
AST SERPL-CCNC: 14 U/L (ref 10–40)
BILIRUB SERPL-MCNC: 0.3 MG/DL (ref 0.1–1)
BUN SERPL-MCNC: 14 MG/DL (ref 8–23)
CALCIUM SERPL-MCNC: 9.2 MG/DL (ref 8.7–10.5)
CHLORIDE SERPL-SCNC: 103 MMOL/L (ref 95–110)
CO2 SERPL-SCNC: 27 MMOL/L (ref 23–29)
CREAT SERPL-MCNC: 0.8 MG/DL (ref 0.5–1.4)
EST. GFR  (AFRICAN AMERICAN): >60 ML/MIN/1.73 M^2
EST. GFR  (NON AFRICAN AMERICAN): >60 ML/MIN/1.73 M^2
GLUCOSE SERPL-MCNC: 80 MG/DL (ref 70–110)
POTASSIUM SERPL-SCNC: 4.1 MMOL/L (ref 3.5–5.1)
PROT SERPL-MCNC: 7 G/DL (ref 6–8.4)
SODIUM SERPL-SCNC: 139 MMOL/L (ref 136–145)

## 2022-05-09 PROCEDURE — 36415 COLL VENOUS BLD VENIPUNCTURE: CPT | Mod: S$GLB,,, | Performed by: INTERNAL MEDICINE

## 2022-05-09 PROCEDURE — 80053 COMPREHEN METABOLIC PANEL: CPT | Performed by: NURSE PRACTITIONER

## 2022-05-09 PROCEDURE — 3075F SYST BP GE 130 - 139MM HG: CPT | Mod: CPTII,S$GLB,, | Performed by: NURSE PRACTITIONER

## 2022-05-09 PROCEDURE — 99214 PR OFFICE/OUTPT VISIT, EST, LEVL IV, 30-39 MIN: ICD-10-PCS | Mod: S$GLB,,, | Performed by: NURSE PRACTITIONER

## 2022-05-09 PROCEDURE — 3008F PR BODY MASS INDEX (BMI) DOCUMENTED: ICD-10-PCS | Mod: CPTII,S$GLB,, | Performed by: NURSE PRACTITIONER

## 2022-05-09 PROCEDURE — 3078F DIAST BP <80 MM HG: CPT | Mod: CPTII,S$GLB,, | Performed by: NURSE PRACTITIONER

## 2022-05-09 PROCEDURE — 1159F MED LIST DOCD IN RCRD: CPT | Mod: CPTII,S$GLB,, | Performed by: NURSE PRACTITIONER

## 2022-05-09 PROCEDURE — 3078F PR MOST RECENT DIASTOLIC BLOOD PRESSURE < 80 MM HG: ICD-10-PCS | Mod: CPTII,S$GLB,, | Performed by: NURSE PRACTITIONER

## 2022-05-09 PROCEDURE — 4010F PR ACE/ARB THEARPY RXD/TAKEN: ICD-10-PCS | Mod: CPTII,S$GLB,, | Performed by: NURSE PRACTITIONER

## 2022-05-09 PROCEDURE — 1159F PR MEDICATION LIST DOCUMENTED IN MEDICAL RECORD: ICD-10-PCS | Mod: CPTII,S$GLB,, | Performed by: NURSE PRACTITIONER

## 2022-05-09 PROCEDURE — 99214 OFFICE O/P EST MOD 30 MIN: CPT | Mod: S$GLB,,, | Performed by: NURSE PRACTITIONER

## 2022-05-09 PROCEDURE — 4010F ACE/ARB THERAPY RXD/TAKEN: CPT | Mod: CPTII,S$GLB,, | Performed by: NURSE PRACTITIONER

## 2022-05-09 PROCEDURE — 3075F PR MOST RECENT SYSTOLIC BLOOD PRESS GE 130-139MM HG: ICD-10-PCS | Mod: CPTII,S$GLB,, | Performed by: NURSE PRACTITIONER

## 2022-05-09 PROCEDURE — 36415 PR COLLECTION VENOUS BLOOD,VENIPUNCTURE: ICD-10-PCS | Mod: S$GLB,,, | Performed by: INTERNAL MEDICINE

## 2022-05-09 PROCEDURE — 3008F BODY MASS INDEX DOCD: CPT | Mod: CPTII,S$GLB,, | Performed by: NURSE PRACTITIONER

## 2022-05-09 RX ORDER — CLOBETASOL PROPIONATE 0.5 MG/G
AEROSOL, FOAM TOPICAL 2 TIMES DAILY
Qty: 50 G | Refills: 1 | Status: SHIPPED | OUTPATIENT
Start: 2022-05-09 | End: 2022-05-10

## 2022-05-09 RX ORDER — FUROSEMIDE 20 MG/1
20 TABLET ORAL 2 TIMES DAILY PRN
Qty: 30 TABLET | Refills: 0 | Status: SHIPPED | OUTPATIENT
Start: 2022-05-09 | End: 2022-06-28

## 2022-05-09 NOTE — PROGRESS NOTES
Venipuncture performed with 23 gauge butterfly, x's 1 attempt,  to R Cephalic vein.  Specimens collected per orders.      Pressure dressing applied to site, instructed patient to remove dressing in 10-15 minutes, OK to re-adjust dressing if pressure causing any discomfort, to observe closely for numbness and/or discoloration to hand or fingers, and to notify provider if bleeding persists after applying constant pressure lasting 30 minutes.

## 2022-05-09 NOTE — PATIENT INSTRUCTIONS
Watch the salt in the diet.  Walk around frequently.  Consider wearing compression hose.    Double up on the lasix for 3 days.  Then return to normal daily dose.    Blood work today.  I'll let you know the results.    If you have concerns or questions, please do not hesitate to call.  If you have any questions, please do not hesitate to call.  You can reach us at [phone] Monday through Friday 7 a.m. to 6:30 p.m., Saturday 9 a.m. to 4:30 p.m.   Thank you for using the [facility]!    CHANDLER Buckley, CNP, FNP-BC [facility]    To rate your experience with JOLENE Buckley, click on the link below:      [email or web address]

## 2022-05-09 NOTE — PROGRESS NOTES
"Huyen Gracia is a 66 y.o. female patient of Dr. Demetrius De La Rosa III, PA-C who presents to the clinic today for a Virtual Visit.    HPI    Patient, who is not known to me, reports a new problem to me: left > R lower extremity swelling for the past 2 weeks..    Takes the lasix daily.  Takes 4 medications for BP.  Has had swelling with being on her feet for long periods.  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  No pain associated    These symptoms began 2 weeks  ago and status is continuing.  Goes down overnight but never resolves.  BP is controlled.    Pt denies the following symptoms:  Change in diet, change in activity    Aggravating factors include standing at a conference 2 days ago.  Stood for about 4 hrs. .    Relieving factors include somehwhat overnight. .    OTC Medications tried are no new meds..    Prescription medications taken for symptoms are lasix daily..    Pertinent medical history:  H/o HTN..    ROS    Constitutional:  No fever, no fatigue.    HEENT:  Head cold. Sneezing.    Heart:    No palpitations, no chest pain.    Lungs:   No difficulty breathing, no coughing.  GI/-no sxs.    MS:  No  change in bones, joints or muscles.    Skin:  + rash.  Ointments, steroids, Zyrtec/Pepcid--no help.  Dermatologist did shave bx-nothing identified.  Halobetasol foam has helped.      Past Medical History:   Diagnosis Date    Dysphagia     General anesthetics causing adverse effect in therapeutic use     No Versed/ Pt states "fights" going in and out    GERD (gastroesophageal reflux disease)     History of 2019 novel coronavirus disease (COVID-19)     Hypertension     Lumbar disc disease        Current Outpatient Medications:     baclofen (LIORESAL) 10 MG tablet, Take 10 mg by mouth 3 (three) times daily., Disp: , Rfl:     butalbital-acetaminophen-caffeine -40 mg (FIORICET, ESGIC) -40 mg per tablet, TAKE 1 TABLET BY MOUTH 3 TIMES A DAY AS NEEDED **MUST LAST 30 DAYS**, Disp: 60 " tablet, Rfl: 2    cloNIDine (CATAPRES) 0.1 MG tablet, TAKE ONE TABLET BY MOUTH EVERY EVENING, Disp: 30 tablet, Rfl: 11    cyclobenzaprine (FLEXERIL) 10 MG tablet, Take 10 mg by mouth 3 (three) times daily as needed for Muscle spasms., Disp: , Rfl:     dicyclomine (BENTYL) 20 mg tablet, Take 1 tablet (20 mg total) by mouth every 6 (six) hours., Disp: 120 tablet, Rfl: 3    diphenhydrAMINE (BENADRYL) 50 MG capsule, Take 50 mg by mouth nightly as needed for Insomnia., Disp: , Rfl:     diphenoxylate-atropine 2.5-0.025 mg (LOMOTIL) 2.5-0.025 mg per tablet, Take 1 tablet by mouth 4 (four) times daily as needed., Disp: 45 tablet, Rfl: 1    enalapril (VASOTEC) 2.5 MG tablet, TAKE 1 TABLET (2.5 MG TOTAL) BY MOUTH ONCE DAILY., Disp: 90 tablet, Rfl: 3    estradiol (ESTRACE) 1 MG tablet, Take 2 mg by mouth once daily., Disp: , Rfl: 11    felodipine (PLENDIL) 10 MG 24 hr tablet, Take 1 tablet (10 mg total) by mouth once daily., Disp: 30 tablet, Rfl: 11    gabapentin (NEURONTIN) 300 MG capsule, Take 600 mg by mouth 3 (three) times daily., Disp: , Rfl:     hyoscyamine (LEVSIN/SL) 0.125 mg Subl, Dissolve 1 to 2 tablets under tongue (or chew 2) every three to four hours as needed to relieve esophagus spasms., Disp: 30 tablet, Rfl: 6    melatonin 10 mg Tab, Take 1 tablet by mouth nightly., Disp: , Rfl:     meloxicam (MOBIC) 7.5 MG tablet, Take 7.5 mg by mouth once daily., Disp: , Rfl:     ondansetron (ZOFRAN) 4 MG tablet, TAKE 1 TABLET (4 MG TOTAL) BY MOUTH EVERY 6 (SIX) HOURS AS NEEDED FOR NAUSEA., Disp: 30 tablet, Rfl: 2    pantoprazole (PROTONIX) 40 MG tablet, TAKE 1 TABLET BY MOUTH BEFORE BREAKFAST., Disp: 90 tablet, Rfl: 3    traMADol (ULTRAM) 50 mg tablet, TAKE 1 TABLET BY MOUTH 3 TIMES A DAY AS NEEDED FOR PAIN, Disp: 60 tablet, Rfl: 2    triamcinolone acetonide 0.1% (KENALOG) 0.1 % ointment, Apply topically 2 (two) times daily., Disp: 45 g, Rfl: 3    triamterene-hydrochlorothiazide 37.5-25 mg (DYAZIDE) 37.5-25  "mg per capsule, TAKE 1 CAPSULE BY MOUTH EVERY MORNING., Disp: 30 capsule, Rfl: 11    cetirizine (ZYRTEC) 10 MG tablet, Take 10 mg by mouth once daily., Disp: , Rfl:     fluocinonide 0.05% (LIDEX) 0.05 % cream, Apply topically 2 (two) times daily. (Patient not taking: Reported on 5/9/2022), Disp: 15 g, Rfl: 1    neomycin-polymyxin-hydrocortisone (CORTISPORIN) otic solution, Place 3 drops into the right ear 3 (three) times daily. (Patient not taking: Reported on 5/9/2022), Disp: 1 Bottle, Rfl: 0    Patient is not a smoker.    PHYSICAL EXAM    Alert, coop 66 y.o. female patient in no acute distress, not ill appearing.    Vitals:    05/09/22 1316   BP: 134/66   Pulse: 78   Temp: 98.3 °F (36.8 °C)   TempSrc: Oral   SpO2: 98%   Weight: 87.8 kg (193 lb 7.3 oz)   Height: 4' 11" (1.499 m)     VS reviewed.  VS stable.  CC, nursing note, medications & PMH all reviewed today.    Head:  Normocephalic, atraumatic.    EENT: Ext nose/ears are without lesions or erythema.  No drainage noted.  Eyes lids symmetrical and with out lesions, conjunctivae not injected.  EOMs intact.             Resp:  Respirations even, unlabored              Lungs CTA bialt    CV:  Cap RF brisk.  No circumoral cyanosis noted.           Heart RRR, no murmur.           L>R  pedal edema noted--on left to mid-shin and on right to above ankle.  Nonpitting edema.    MS:   Ambulates independently .    NEURO:  Alert and oriented x 4.  Responds appropriately during interaction.    Skin:  Warm, dry, color good.            Small area with several erythematous papules on the right lower shin area.  No pustules.      Psych:  Responds appropriately throughout the visit.               Relaxed.  Well-groomed.    Pedal edema  -     Comprehensive Metabolic Panel; Future; Expected date: 05/09/2022  -     furosemide (LASIX) 20 MG tablet; Take 1 tablet (20 mg total) by mouth 2 (two) times daily as needed (swelling).  Dispense: 30 tablet; Refill: 0    Rash and nonspecific " skin eruption  -     clobetasoL (OLUX) 0.05 % Foam; Apply topically 2 (two) times daily.  Dispense: 50 g; Refill: 1            Pt today presents with concern for bilat, L>R, pedal edema.  Has had this in the past but worse since she was at a conference standing much of the time 2 days ago.  She does take lasix daily and her BP is controlled.  She has lowered the salt in her diet but no excluded it.  She did lose about 7 lbs.  She additionally, she has a rash on the right lower leg for some time.  Derm could not determine etiology.  However, halobetasol foam nightly helps.    Advised to take lasix extra tablets x 3 days and repeat as needed for edmea.  Limit salt in diet.  Wear compression hose.  Consider weight loss.    Pt advised to perform comfort measures recommended on patient instruction sheet, which were reviewed at the time of the visit..    If not better in 2 weeks, the patient is advised to call here, PCP office or go for an in-person/follow up evaluation.  If worse or concerns, the patient is advised to call for advise to this office or the PCP office or call [facility] or go to the nearest URGENT CARE or ER.  Explained exam findings, diagnosis and treatment plan to patient.  Questions answered and patient states understanding.

## 2022-05-10 ENCOUNTER — TELEPHONE (OUTPATIENT)
Dept: FAMILY MEDICINE | Facility: CLINIC | Age: 67
End: 2022-05-10
Payer: COMMERCIAL

## 2022-05-10 ENCOUNTER — PATIENT MESSAGE (OUTPATIENT)
Dept: FAMILY MEDICINE | Facility: CLINIC | Age: 67
End: 2022-05-10
Payer: COMMERCIAL

## 2022-05-10 DIAGNOSIS — R21 RASH AND NONSPECIFIC SKIN ERUPTION: Primary | ICD-10-CM

## 2022-05-10 RX ORDER — CLOBETASOL PROPIONATE 0.46 MG/ML
SOLUTION TOPICAL 2 TIMES DAILY
Qty: 50 ML | Refills: 1 | Status: SHIPPED | OUTPATIENT
Start: 2022-05-10

## 2022-06-28 ENCOUNTER — OFFICE VISIT (OUTPATIENT)
Dept: FAMILY MEDICINE | Facility: CLINIC | Age: 67
End: 2022-06-28
Payer: COMMERCIAL

## 2022-06-28 VITALS
WEIGHT: 193.25 LBS | DIASTOLIC BLOOD PRESSURE: 64 MMHG | BODY MASS INDEX: 39.03 KG/M2 | OXYGEN SATURATION: 98 % | SYSTOLIC BLOOD PRESSURE: 114 MMHG | HEART RATE: 77 BPM

## 2022-06-28 DIAGNOSIS — R60.0 PEDAL EDEMA: ICD-10-CM

## 2022-06-28 DIAGNOSIS — K52.9 GE (GASTROENTERITIS): ICD-10-CM

## 2022-06-28 DIAGNOSIS — Z12.11 SCREENING FOR COLON CANCER: ICD-10-CM

## 2022-06-28 DIAGNOSIS — K21.9 GASTROESOPHAGEAL REFLUX DISEASE, UNSPECIFIED WHETHER ESOPHAGITIS PRESENT: ICD-10-CM

## 2022-06-28 DIAGNOSIS — K58.9 IRRITABLE BOWEL SYNDROME, UNSPECIFIED TYPE: ICD-10-CM

## 2022-06-28 DIAGNOSIS — I10 HYPERTENSION, UNSPECIFIED TYPE: Primary | ICD-10-CM

## 2022-06-28 PROCEDURE — 4010F PR ACE/ARB THEARPY RXD/TAKEN: ICD-10-PCS | Mod: CPTII,S$GLB,, | Performed by: PHYSICIAN ASSISTANT

## 2022-06-28 PROCEDURE — 1159F MED LIST DOCD IN RCRD: CPT | Mod: CPTII,S$GLB,, | Performed by: PHYSICIAN ASSISTANT

## 2022-06-28 PROCEDURE — 99214 OFFICE O/P EST MOD 30 MIN: CPT | Mod: S$GLB,,, | Performed by: PHYSICIAN ASSISTANT

## 2022-06-28 PROCEDURE — 1159F PR MEDICATION LIST DOCUMENTED IN MEDICAL RECORD: ICD-10-PCS | Mod: CPTII,S$GLB,, | Performed by: PHYSICIAN ASSISTANT

## 2022-06-28 PROCEDURE — 3078F PR MOST RECENT DIASTOLIC BLOOD PRESSURE < 80 MM HG: ICD-10-PCS | Mod: CPTII,S$GLB,, | Performed by: PHYSICIAN ASSISTANT

## 2022-06-28 PROCEDURE — 3008F BODY MASS INDEX DOCD: CPT | Mod: CPTII,S$GLB,, | Performed by: PHYSICIAN ASSISTANT

## 2022-06-28 PROCEDURE — 3078F DIAST BP <80 MM HG: CPT | Mod: CPTII,S$GLB,, | Performed by: PHYSICIAN ASSISTANT

## 2022-06-28 PROCEDURE — 3074F PR MOST RECENT SYSTOLIC BLOOD PRESSURE < 130 MM HG: ICD-10-PCS | Mod: CPTII,S$GLB,, | Performed by: PHYSICIAN ASSISTANT

## 2022-06-28 PROCEDURE — 99214 PR OFFICE/OUTPT VISIT, EST, LEVL IV, 30-39 MIN: ICD-10-PCS | Mod: S$GLB,,, | Performed by: PHYSICIAN ASSISTANT

## 2022-06-28 PROCEDURE — 3008F PR BODY MASS INDEX (BMI) DOCUMENTED: ICD-10-PCS | Mod: CPTII,S$GLB,, | Performed by: PHYSICIAN ASSISTANT

## 2022-06-28 PROCEDURE — 4010F ACE/ARB THERAPY RXD/TAKEN: CPT | Mod: CPTII,S$GLB,, | Performed by: PHYSICIAN ASSISTANT

## 2022-06-28 PROCEDURE — 3074F SYST BP LT 130 MM HG: CPT | Mod: CPTII,S$GLB,, | Performed by: PHYSICIAN ASSISTANT

## 2022-06-28 RX ORDER — TRIAMCINOLONE ACETONIDE 1 MG/G
OINTMENT TOPICAL 2 TIMES DAILY
Qty: 45 G | Refills: 3 | Status: SHIPPED | OUTPATIENT
Start: 2022-06-28

## 2022-06-28 RX ORDER — FUROSEMIDE 20 MG/1
20 TABLET ORAL DAILY
Qty: 90 TABLET | Refills: 3 | Status: SHIPPED | OUTPATIENT
Start: 2022-06-28 | End: 2023-01-31

## 2022-06-28 RX ORDER — HYOSCYAMINE SULFATE 0.12 MG/1
TABLET SUBLINGUAL
Qty: 30 TABLET | Refills: 6 | Status: SHIPPED | OUTPATIENT
Start: 2022-06-28

## 2022-06-28 RX ORDER — DIPHENOXYLATE HYDROCHLORIDE AND ATROPINE SULFATE 2.5; .025 MG/1; MG/1
1 TABLET ORAL 4 TIMES DAILY PRN
Qty: 30 TABLET | Refills: 5 | Status: SHIPPED | OUTPATIENT
Start: 2022-06-28 | End: 2022-10-06

## 2022-06-28 NOTE — PROGRESS NOTES
"Subjective:       Patient ID: Huyen Gracia is a 66 y.o. female.    Chief Complaint: Follow-up    Patient is a 67 yo female coming in for a check up. She was seen 1 month ago for edema. She was placed on lasix, but she feels the pharmacy just gave her a refill on her dyazide.       Edema  This is a recurrent problem. The current episode started more than 1 month ago. The problem occurs daily. The problem has been waxing and waning. Pertinent negatives include no abdominal pain, chest pain, chills, fatigue, headaches or neck pain. 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 Treatments tried: avoiding salt.   Hypertension  This is a chronic problem. The current episode started more than 1 year ago. The problem is unchanged. The problem is controlled. Associated symptoms include peripheral edema. Pertinent negatives include no anxiety, blurred vision, chest pain, headaches, malaise/fatigue, neck pain, orthopnea, palpitations, PND, shortness of breath or sweats. There are no associated agents to hypertension. Risk factors for coronary artery disease include post-menopausal state. Past treatments include alpha 1 blockers, calcium channel blockers, diuretics and lifestyle changes. The current treatment provides moderate improvement.     Past Medical History:   Diagnosis Date    Dysphagia     General anesthetics causing adverse effect in therapeutic use     No Versed/ Pt states "fights" going in and out    GERD (gastroesophageal reflux disease)     History of 2019 novel coronavirus disease (COVID-19)     Hypertension     Lumbar disc disease        Review of Systems   Constitutional: Negative for activity change, chills, fatigue and malaise/fatigue.   Eyes: Negative for blurred vision.   Respiratory: Negative for chest tightness and shortness of breath.    Cardiovascular: Negative for chest pain, palpitations, orthopnea and PND.   Gastrointestinal: Negative for abdominal pain.   Musculoskeletal: Negative " for neck pain.   Neurological: Negative for headaches.         Objective:      Physical Exam  Vitals reviewed.   Constitutional:       General: She is not in acute distress.     Appearance: Normal appearance. She is not ill-appearing, toxic-appearing or diaphoretic.   HENT:      Head: Normocephalic and atraumatic.   Cardiovascular:      Rate and Rhythm: Normal rate and regular rhythm.      Pulses: Normal pulses.      Heart sounds: Normal heart sounds. No murmur heard.    No friction rub. No gallop.   Pulmonary:      Effort: Pulmonary effort is normal. No respiratory distress.      Breath sounds: Normal breath sounds. No stridor. No wheezing, rhonchi or rales.   Chest:      Chest wall: No tenderness.   Abdominal:      Tenderness: There is no abdominal tenderness.   Musculoskeletal:         General: Swelling present. No tenderness, deformity or signs of injury.      Right lower leg: No edema.      Left lower leg: No edema.      Comments: Trace pedal edema   Neurological:      Mental Status: She is alert.         Assessment:       Problem List Items Addressed This Visit     Pedal edema    Relevant Medications    furosemide (LASIX) 20 MG tablet    Other Relevant Orders    US Lower Extremity Veins Bilateral    Irritable bowel syndrome    Hypertension - Primary    GERD (gastroesophageal reflux disease)      Other Visit Diagnoses     GE (gastroenteritis)        Relevant Medications    diphenoxylate-atropine 2.5-0.025 mg (LOMOTIL) 2.5-0.025 mg per tablet    Screening for colon cancer        Relevant Orders    Cologuard Screening (Multitarget Stool DNA)          Plan:       Hypertension, unspecified type    Gastroesophageal reflux disease, unspecified whether esophagitis present    Pedal edema  -     furosemide (LASIX) 20 MG tablet; Take 1 tablet (20 mg total) by mouth once daily.  Dispense: 90 tablet; Refill: 3  -     US Lower Extremity Veins Bilateral; Future; Expected date: 06/28/2022    Irritable bowel syndrome,  unspecified type    GE (gastroenteritis)  -     diphenoxylate-atropine 2.5-0.025 mg (LOMOTIL) 2.5-0.025 mg per tablet; Take 1 tablet by mouth 4 (four) times daily as needed.  Dispense: 30 tablet; Refill: 5    Screening for colon cancer  -     Cologuard Screening (Multitarget Stool DNA); Future; Expected date: 06/28/2022    Other orders  -     triamcinolone acetonide 0.1% (KENALOG) 0.1 % ointment; Apply topically 2 (two) times daily.  Dispense: 45 g; Refill: 3  -     hyoscyamine (LEVSIN/SL) 0.125 mg Subl; Dissolve 1 to 2 tablets under tongue (or chew 2) every three to four hours as needed to relieve esophagus spasms.  Dispense: 30 tablet; Refill: 6

## 2022-07-01 ENCOUNTER — HOSPITAL ENCOUNTER (OUTPATIENT)
Dept: RADIOLOGY | Facility: HOSPITAL | Age: 67
Discharge: HOME | End: 2022-07-01
Attending: PHYSICIAN ASSISTANT
Payer: COMMERCIAL

## 2022-07-01 DIAGNOSIS — R60.0 PEDAL EDEMA: ICD-10-CM

## 2022-07-01 PROCEDURE — 93970 US LOWER EXTREMITY VEINS BILATERAL: ICD-10-PCS | Mod: 26,,, | Performed by: RADIOLOGY

## 2022-07-01 PROCEDURE — 93970 EXTREMITY STUDY: CPT | Mod: TC,PO

## 2022-07-01 PROCEDURE — 93970 EXTREMITY STUDY: CPT | Mod: 26,,, | Performed by: RADIOLOGY

## 2022-07-11 DIAGNOSIS — R19.5 POSITIVE COLORECTAL CANCER SCREENING USING COLOGUARD TEST: Primary | ICD-10-CM

## 2022-07-11 LAB — NONINV COLON CA DNA+OCC BLD SCRN STL QL: POSITIVE

## 2022-07-12 ENCOUNTER — TELEPHONE (OUTPATIENT)
Dept: FAMILY MEDICINE | Facility: CLINIC | Age: 67
End: 2022-07-12
Payer: COMMERCIAL

## 2022-07-12 ENCOUNTER — TELEPHONE (OUTPATIENT)
Dept: GASTROENTEROLOGY | Facility: CLINIC | Age: 67
End: 2022-07-12
Payer: COMMERCIAL

## 2022-07-12 NOTE — TELEPHONE ENCOUNTER
----- Message from Demetrius De La Rosa III, PA-C sent at 7/11/2022  5:09 PM CDT -----  Huyen Gracia    The screening test for colon cancer I positive. I recommend a colonoscopy further evaluated the colon.

## 2022-07-12 NOTE — TELEPHONE ENCOUNTER
Pt aware of test results, will call to schedule appt to be further evaluated, no other concerns voiced

## 2022-07-29 ENCOUNTER — OFFICE VISIT (OUTPATIENT)
Dept: FAMILY MEDICINE | Facility: CLINIC | Age: 67
End: 2022-07-29
Payer: COMMERCIAL

## 2022-07-29 VITALS
HEIGHT: 59 IN | DIASTOLIC BLOOD PRESSURE: 70 MMHG | SYSTOLIC BLOOD PRESSURE: 138 MMHG | BODY MASS INDEX: 38.88 KG/M2 | WEIGHT: 192.88 LBS | HEART RATE: 64 BPM

## 2022-07-29 DIAGNOSIS — R60.9 EDEMA, UNSPECIFIED TYPE: Primary | ICD-10-CM

## 2022-07-29 PROCEDURE — 4010F PR ACE/ARB THEARPY RXD/TAKEN: ICD-10-PCS | Mod: CPTII,S$GLB,, | Performed by: PHYSICIAN ASSISTANT

## 2022-07-29 PROCEDURE — 3078F PR MOST RECENT DIASTOLIC BLOOD PRESSURE < 80 MM HG: ICD-10-PCS | Mod: CPTII,S$GLB,, | Performed by: PHYSICIAN ASSISTANT

## 2022-07-29 PROCEDURE — 3008F PR BODY MASS INDEX (BMI) DOCUMENTED: ICD-10-PCS | Mod: CPTII,S$GLB,, | Performed by: PHYSICIAN ASSISTANT

## 2022-07-29 PROCEDURE — 99213 PR OFFICE/OUTPT VISIT, EST, LEVL III, 20-29 MIN: ICD-10-PCS | Mod: S$GLB,,, | Performed by: PHYSICIAN ASSISTANT

## 2022-07-29 PROCEDURE — 1159F PR MEDICATION LIST DOCUMENTED IN MEDICAL RECORD: ICD-10-PCS | Mod: CPTII,S$GLB,, | Performed by: PHYSICIAN ASSISTANT

## 2022-07-29 PROCEDURE — 3008F BODY MASS INDEX DOCD: CPT | Mod: CPTII,S$GLB,, | Performed by: PHYSICIAN ASSISTANT

## 2022-07-29 PROCEDURE — 3078F DIAST BP <80 MM HG: CPT | Mod: CPTII,S$GLB,, | Performed by: PHYSICIAN ASSISTANT

## 2022-07-29 PROCEDURE — 99213 OFFICE O/P EST LOW 20 MIN: CPT | Mod: S$GLB,,, | Performed by: PHYSICIAN ASSISTANT

## 2022-07-29 PROCEDURE — 3075F PR MOST RECENT SYSTOLIC BLOOD PRESS GE 130-139MM HG: ICD-10-PCS | Mod: CPTII,S$GLB,, | Performed by: PHYSICIAN ASSISTANT

## 2022-07-29 PROCEDURE — 3075F SYST BP GE 130 - 139MM HG: CPT | Mod: CPTII,S$GLB,, | Performed by: PHYSICIAN ASSISTANT

## 2022-07-29 PROCEDURE — 4010F ACE/ARB THERAPY RXD/TAKEN: CPT | Mod: CPTII,S$GLB,, | Performed by: PHYSICIAN ASSISTANT

## 2022-07-29 PROCEDURE — 1159F MED LIST DOCD IN RCRD: CPT | Mod: CPTII,S$GLB,, | Performed by: PHYSICIAN ASSISTANT

## 2022-07-29 PROCEDURE — 1160F RVW MEDS BY RX/DR IN RCRD: CPT | Mod: CPTII,S$GLB,, | Performed by: PHYSICIAN ASSISTANT

## 2022-07-29 PROCEDURE — 1160F PR REVIEW ALL MEDS BY PRESCRIBER/CLIN PHARMACIST DOCUMENTED: ICD-10-PCS | Mod: CPTII,S$GLB,, | Performed by: PHYSICIAN ASSISTANT

## 2022-07-29 RX ORDER — TRIAMTERENE AND HYDROCHLOROTHIAZIDE 37.5; 25 MG/1; MG/1
1 CAPSULE ORAL EVERY MORNING
Qty: 90 CAPSULE | Refills: 3 | Status: SHIPPED | OUTPATIENT
Start: 2022-07-29 | End: 2023-01-31

## 2022-07-29 NOTE — PROGRESS NOTES
Subjective:       Patient ID: Huyen Gracia is a 66 y.o. female.    Chief Complaint: Edema    Patient is a 67 yo female with edema. She was placed on lasix which worked very well, but states she can't take it while working as a .     Review of Systems   Constitutional: Negative for activity change, fatigue and fever.   Respiratory: Negative for chest tightness and shortness of breath.    Cardiovascular: Negative for chest pain.   Gastrointestinal: Negative for abdominal pain.         Objective:      Physical Exam  Vitals reviewed.   Constitutional:       General: She is not in acute distress.     Appearance: Normal appearance. She is not ill-appearing, toxic-appearing or diaphoretic.   Cardiovascular:      Rate and Rhythm: Normal rate and regular rhythm.      Pulses: Normal pulses.      Heart sounds: Normal heart sounds. No murmur heard.    No friction rub. No gallop.   Pulmonary:      Effort: Pulmonary effort is normal. No respiratory distress.      Breath sounds: Normal breath sounds. No stridor. No wheezing, rhonchi or rales.   Chest:      Chest wall: No tenderness.   Abdominal:      Tenderness: There is no abdominal tenderness.   Musculoskeletal:         General: No swelling.   Neurological:      Mental Status: She is alert.         Assessment:       Problem List Items Addressed This Visit    None     Visit Diagnoses     Edema, unspecified type    -  Primary    Relevant Orders    COMPRESSION STOCKINGS          Plan:       Edema, unspecified type  -     COMPRESSION STOCKINGS    Other orders  - Hold lasix for now:        triamterene-hydrochlorothiazide 37.5-25 mg (DYAZIDE) 37.5-25 mg per capsule; Take 1 capsule by mouth every morning.  Dispense: 90 capsule; Refill: 3

## 2022-08-15 ENCOUNTER — TELEPHONE (OUTPATIENT)
Dept: GASTROENTEROLOGY | Facility: CLINIC | Age: 67
End: 2022-08-15
Payer: COMMERCIAL

## 2022-08-15 NOTE — TELEPHONE ENCOUNTER
----- Message from Lynn Fenton sent at 8/15/2022  9:59 AM CDT -----  Contact: Pt  Type: Needs Medical Advice    Who Called:  Pt    Best Call Back Number: [phone]    Additional Information: Pt has appt for colonoscopy on 10/21 & wants to know if she can also have an EGD to dilate her esophagus.      Please call back.  Thanks.

## 2022-08-15 NOTE — TELEPHONE ENCOUNTER
Returned call to the patient, patient was requesting to add an EGD to her scheduled colonoscopy, procedure schedule reviewed for that date and unable to add anything on for that date, patient advised to call back occasionally to see if someone has cancelled and then we would be able to add an EGD to her colonoscopy, patient verbalized understanding of this.

## 2022-08-29 ENCOUNTER — TELEPHONE (OUTPATIENT)
Dept: GASTROENTEROLOGY | Facility: CLINIC | Age: 67
End: 2022-08-29
Payer: COMMERCIAL

## 2022-08-29 NOTE — TELEPHONE ENCOUNTER
Returned call to the patient, patient was requesting to have her EGD on the same date as her colonoscopy so that she doesn't have to get anesthesia twice, procedure added to schedule, new prep information sent to the patient, patient verbalized understanding of the, prep instructions sent to my chart.

## 2022-08-29 NOTE — TELEPHONE ENCOUNTER
----- Message from Tim Rodriguez, Patient Care Assistant sent at 8/29/2022 10:13 AM CDT -----  Contact: Pt  Type: Return Call    Who Called: Pt  Who Left Message for Pt: Eun  Does the patient know what this is regarding: Yes  Best Call Back Number: [phone]  Thank you~

## 2022-09-06 ENCOUNTER — TELEPHONE (OUTPATIENT)
Dept: GASTROENTEROLOGY | Facility: CLINIC | Age: 67
End: 2022-09-06
Payer: COMMERCIAL

## 2022-09-06 NOTE — TELEPHONE ENCOUNTER
Called and spoke with the patient, patient was notified that Dr. 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  Patient was advised that she would be placed on a wait list in the event a cancellation occurs the week of 10/21/2022 as she is off work that week, patient verbalized understanding of this.

## 2022-09-19 LAB
BCS RECOMMENDATION EXT: NORMAL
BCS RECOMMENDATION EXT: NORMAL

## 2022-10-05 DIAGNOSIS — Z12.31 OTHER SCREENING MAMMOGRAM: ICD-10-CM

## 2022-10-10 ENCOUNTER — PATIENT OUTREACH (OUTPATIENT)
Dept: ADMINISTRATIVE | Facility: HOSPITAL | Age: 67
End: 2022-10-10
Payer: COMMERCIAL

## 2022-10-10 ENCOUNTER — PATIENT MESSAGE (OUTPATIENT)
Dept: ADMINISTRATIVE | Facility: HOSPITAL | Age: 67
End: 2022-10-10
Payer: COMMERCIAL

## 2022-12-21 ENCOUNTER — PATIENT MESSAGE (OUTPATIENT)
Dept: GASTROENTEROLOGY | Facility: CLINIC | Age: 67
End: 2022-12-21
Payer: COMMERCIAL

## 2022-12-22 ENCOUNTER — PATIENT MESSAGE (OUTPATIENT)
Dept: GASTROENTEROLOGY | Facility: CLINIC | Age: 67
End: 2022-12-22
Payer: COMMERCIAL

## 2022-12-27 ENCOUNTER — TELEPHONE (OUTPATIENT)
Dept: GASTROENTEROLOGY | Facility: CLINIC | Age: 67
End: 2022-12-27
Payer: COMMERCIAL

## 2022-12-27 NOTE — TELEPHONE ENCOUNTER
Called and spoke with the patient, patient rescheduled her procedure, patient verbalized understanding of this.

## 2022-12-27 NOTE — TELEPHONE ENCOUNTER
----- Message from Cherelle Hart sent at 12/27/2022 12:57 PM CST -----  Type: Needs Medical Advice  Who Called:  pt     Best Call Back Number: [phone] (home)    Additional Information: pt wants to cancel appt for suman 3. Please advise thank you

## 2022-12-28 ENCOUNTER — TELEPHONE (OUTPATIENT)
Dept: GASTROENTEROLOGY | Facility: CLINIC | Age: 67
End: 2022-12-28
Payer: COMMERCIAL

## 2022-12-28 NOTE — TELEPHONE ENCOUNTER
Returned call to the apteitn, patient rescheduled her procedure, procedure rescheduled with the patient, patient verbalized understanding of this.

## 2022-12-28 NOTE — TELEPHONE ENCOUNTER
----- Message from Hebert Benitez sent at 12/28/2022  2:37 PM CST -----  Type: Needs Medical Advice  Who Called:  Patient    Best Call Back Number: [phone]  Additional Information: Patient states that she would like a callback regarding rescheduling her procedure.

## 2022-12-29 ENCOUNTER — PATIENT MESSAGE (OUTPATIENT)
Dept: FAMILY MEDICINE | Facility: CLINIC | Age: 67
End: 2022-12-29
Payer: COMMERCIAL

## 2022-12-29 DIAGNOSIS — K52.9 GE (GASTROENTERITIS): ICD-10-CM

## 2022-12-29 RX ORDER — DIPHENOXYLATE HYDROCHLORIDE AND ATROPINE SULFATE 2.5; .025 MG/1; MG/1
TABLET ORAL
Qty: 30 TABLET | Refills: 0 | Status: SHIPPED | OUTPATIENT
Start: 2022-12-29 | End: 2023-02-08

## 2023-01-31 ENCOUNTER — OFFICE VISIT (OUTPATIENT)
Dept: FAMILY MEDICINE | Facility: CLINIC | Age: 68
End: 2023-01-31
Payer: COMMERCIAL

## 2023-01-31 VITALS
TEMPERATURE: 98 F | BODY MASS INDEX: 38.69 KG/M2 | RESPIRATION RATE: 18 BRPM | HEART RATE: 84 BPM | SYSTOLIC BLOOD PRESSURE: 115 MMHG | OXYGEN SATURATION: 97 % | WEIGHT: 191.56 LBS | DIASTOLIC BLOOD PRESSURE: 62 MMHG

## 2023-01-31 DIAGNOSIS — Z00.00 WELLNESS EXAMINATION: ICD-10-CM

## 2023-01-31 DIAGNOSIS — I10 HYPERTENSION, UNSPECIFIED TYPE: ICD-10-CM

## 2023-01-31 DIAGNOSIS — L30.9 DERMATITIS: Primary | ICD-10-CM

## 2023-01-31 LAB
ALBUMIN SERPL BCP-MCNC: 3.7 G/DL (ref 3.5–5.2)
ALP SERPL-CCNC: 146 U/L (ref 55–135)
ALT SERPL W/O P-5'-P-CCNC: 22 U/L (ref 10–44)
ANION GAP SERPL CALC-SCNC: 12 MMOL/L (ref 8–16)
AST SERPL-CCNC: 21 U/L (ref 10–40)
BASOPHILS # BLD AUTO: 0.03 K/UL (ref 0–0.2)
BASOPHILS NFR BLD: 0.3 % (ref 0–1.9)
BILIRUB SERPL-MCNC: 0.2 MG/DL (ref 0.1–1)
BUN SERPL-MCNC: 14 MG/DL (ref 8–23)
CALCIUM SERPL-MCNC: 9.6 MG/DL (ref 8.7–10.5)
CHLORIDE SERPL-SCNC: 105 MMOL/L (ref 95–110)
CHOLEST SERPL-MCNC: 194 MG/DL (ref 120–199)
CHOLEST/HDLC SERPL: 3.7 {RATIO} (ref 2–5)
CO2 SERPL-SCNC: 23 MMOL/L (ref 23–29)
CREAT SERPL-MCNC: 0.9 MG/DL (ref 0.5–1.4)
DIFFERENTIAL METHOD: NORMAL
EOSINOPHIL # BLD AUTO: 0.4 K/UL (ref 0–0.5)
EOSINOPHIL NFR BLD: 4.7 % (ref 0–8)
ERYTHROCYTE [DISTWIDTH] IN BLOOD BY AUTOMATED COUNT: 13.5 % (ref 11.5–14.5)
ERYTHROCYTE [SEDIMENTATION RATE] IN BLOOD BY PHOTOMETRIC METHOD: 43 MM/HR (ref 0–36)
EST. GFR  (NO RACE VARIABLE): >60 ML/MIN/1.73 M^2
ESTIMATED AVG GLUCOSE: 114 MG/DL (ref 68–131)
GLUCOSE SERPL-MCNC: 97 MG/DL (ref 70–110)
HBA1C MFR BLD: 5.6 % (ref 4–5.6)
HCT VFR BLD AUTO: 38 % (ref 37–48.5)
HDLC SERPL-MCNC: 52 MG/DL (ref 40–75)
HDLC SERPL: 26.8 % (ref 20–50)
HGB BLD-MCNC: 12.2 G/DL (ref 12–16)
IMM GRANULOCYTES # BLD AUTO: 0.02 K/UL (ref 0–0.04)
IMM GRANULOCYTES NFR BLD AUTO: 0.2 % (ref 0–0.5)
LDLC SERPL CALC-MCNC: 118.2 MG/DL (ref 63–159)
LYMPHOCYTES # BLD AUTO: 2.8 K/UL (ref 1–4.8)
LYMPHOCYTES NFR BLD: 32.8 % (ref 18–48)
MCH RBC QN AUTO: 27.5 PG (ref 27–31)
MCHC RBC AUTO-ENTMCNC: 32.1 G/DL (ref 32–36)
MCV RBC AUTO: 86 FL (ref 82–98)
MONOCYTES # BLD AUTO: 0.5 K/UL (ref 0.3–1)
MONOCYTES NFR BLD: 5.8 % (ref 4–15)
NEUTROPHILS # BLD AUTO: 4.8 K/UL (ref 1.8–7.7)
NEUTROPHILS NFR BLD: 56.2 % (ref 38–73)
NONHDLC SERPL-MCNC: 142 MG/DL
NRBC BLD-RTO: 0 /100 WBC
PLATELET # BLD AUTO: 313 K/UL (ref 150–450)
PMV BLD AUTO: 11.2 FL (ref 9.2–12.9)
POTASSIUM SERPL-SCNC: 4.5 MMOL/L (ref 3.5–5.1)
PROT SERPL-MCNC: 7.1 G/DL (ref 6–8.4)
RBC # BLD AUTO: 4.43 M/UL (ref 4–5.4)
SODIUM SERPL-SCNC: 140 MMOL/L (ref 136–145)
T4 FREE SERPL-MCNC: 0.87 NG/DL (ref 0.71–1.51)
TRIGL SERPL-MCNC: 119 MG/DL (ref 30–150)
TSH SERPL DL<=0.005 MIU/L-ACNC: 1.25 UIU/ML (ref 0.4–4)
WBC # BLD AUTO: 8.58 K/UL (ref 3.9–12.7)

## 2023-01-31 PROCEDURE — 3288F FALL RISK ASSESSMENT DOCD: CPT | Mod: CPTII,S$GLB,, | Performed by: PHYSICIAN ASSISTANT

## 2023-01-31 PROCEDURE — 3078F DIAST BP <80 MM HG: CPT | Mod: CPTII,S$GLB,, | Performed by: PHYSICIAN ASSISTANT

## 2023-01-31 PROCEDURE — 3074F PR MOST RECENT SYSTOLIC BLOOD PRESSURE < 130 MM HG: ICD-10-PCS | Mod: CPTII,S$GLB,, | Performed by: PHYSICIAN ASSISTANT

## 2023-01-31 PROCEDURE — 1160F RVW MEDS BY RX/DR IN RCRD: CPT | Mod: CPTII,S$GLB,, | Performed by: PHYSICIAN ASSISTANT

## 2023-01-31 PROCEDURE — 1160F PR REVIEW ALL MEDS BY PRESCRIBER/CLIN PHARMACIST DOCUMENTED: ICD-10-PCS | Mod: CPTII,S$GLB,, | Performed by: PHYSICIAN ASSISTANT

## 2023-01-31 PROCEDURE — 80053 COMPREHEN METABOLIC PANEL: CPT | Performed by: PHYSICIAN ASSISTANT

## 2023-01-31 PROCEDURE — 3008F PR BODY MASS INDEX (BMI) DOCUMENTED: ICD-10-PCS | Mod: CPTII,S$GLB,, | Performed by: PHYSICIAN ASSISTANT

## 2023-01-31 PROCEDURE — 85025 COMPLETE CBC W/AUTO DIFF WBC: CPT | Performed by: PHYSICIAN ASSISTANT

## 2023-01-31 PROCEDURE — 99214 PR OFFICE/OUTPT VISIT, EST, LEVL IV, 30-39 MIN: ICD-10-PCS | Mod: S$GLB,,, | Performed by: PHYSICIAN ASSISTANT

## 2023-01-31 PROCEDURE — 80061 LIPID PANEL: CPT | Performed by: PHYSICIAN ASSISTANT

## 2023-01-31 PROCEDURE — 99214 OFFICE O/P EST MOD 30 MIN: CPT | Mod: S$GLB,,, | Performed by: PHYSICIAN ASSISTANT

## 2023-01-31 PROCEDURE — 1101F PT FALLS ASSESS-DOCD LE1/YR: CPT | Mod: CPTII,S$GLB,, | Performed by: PHYSICIAN ASSISTANT

## 2023-01-31 PROCEDURE — 83036 HEMOGLOBIN GLYCOSYLATED A1C: CPT | Performed by: PHYSICIAN ASSISTANT

## 2023-01-31 PROCEDURE — 84443 ASSAY THYROID STIM HORMONE: CPT | Performed by: PHYSICIAN ASSISTANT

## 2023-01-31 PROCEDURE — 3288F PR FALLS RISK ASSESSMENT DOCUMENTED: ICD-10-PCS | Mod: CPTII,S$GLB,, | Performed by: PHYSICIAN ASSISTANT

## 2023-01-31 PROCEDURE — 3008F BODY MASS INDEX DOCD: CPT | Mod: CPTII,S$GLB,, | Performed by: PHYSICIAN ASSISTANT

## 2023-01-31 PROCEDURE — 1126F AMNT PAIN NOTED NONE PRSNT: CPT | Mod: CPTII,S$GLB,, | Performed by: PHYSICIAN ASSISTANT

## 2023-01-31 PROCEDURE — 86038 ANTINUCLEAR ANTIBODIES: CPT | Performed by: PHYSICIAN ASSISTANT

## 2023-01-31 PROCEDURE — 3074F SYST BP LT 130 MM HG: CPT | Mod: CPTII,S$GLB,, | Performed by: PHYSICIAN ASSISTANT

## 2023-01-31 PROCEDURE — 1101F PR PT FALLS ASSESS DOC 0-1 FALLS W/OUT INJ PAST YR: ICD-10-PCS | Mod: CPTII,S$GLB,, | Performed by: PHYSICIAN ASSISTANT

## 2023-01-31 PROCEDURE — 85652 RBC SED RATE AUTOMATED: CPT | Performed by: PHYSICIAN ASSISTANT

## 2023-01-31 PROCEDURE — 1126F PR PAIN SEVERITY QUANTIFIED, NO PAIN PRESENT: ICD-10-PCS | Mod: CPTII,S$GLB,, | Performed by: PHYSICIAN ASSISTANT

## 2023-01-31 PROCEDURE — 84439 ASSAY OF FREE THYROXINE: CPT | Performed by: PHYSICIAN ASSISTANT

## 2023-01-31 PROCEDURE — 3078F PR MOST RECENT DIASTOLIC BLOOD PRESSURE < 80 MM HG: ICD-10-PCS | Mod: CPTII,S$GLB,, | Performed by: PHYSICIAN ASSISTANT

## 2023-01-31 PROCEDURE — 1159F MED LIST DOCD IN RCRD: CPT | Mod: CPTII,S$GLB,, | Performed by: PHYSICIAN ASSISTANT

## 2023-01-31 PROCEDURE — 1159F PR MEDICATION LIST DOCUMENTED IN MEDICAL RECORD: ICD-10-PCS | Mod: CPTII,S$GLB,, | Performed by: PHYSICIAN ASSISTANT

## 2023-01-31 NOTE — PROGRESS NOTES
Subjective:       Patient ID: Huyen Gracia is a 67 y.o. female.    Chief Complaint: Follow-up    Rash  This is a chronic problem. The current episode started more than 1 month ago. The problem is unchanged. The affected locations include the left lower leg and right lower leg. The rash is characterized by itchiness, redness and swelling. It is unknown if there was an exposure to a precipitant. Pertinent negatives include no diarrhea, fatigue, fever or shortness of breath. Past treatments include antibiotic cream, anti-itch cream, oral steroids and topical steroids (Followed by dermatology).   Hypertension  The current episode started more than 1 year ago. The problem has been waxing and waning since onset. The problem is controlled. Pertinent negatives include no chest pain, headaches or shortness of breath. There are no associated agents to hypertension. Risk factors for coronary artery disease include family history and post-menopausal state. Past treatments include ACE inhibitors and central alpha agonists. The current treatment provides moderate improvement. There are no compliance problems.    Review of Systems   Constitutional:  Negative for activity change, appetite change, fatigue and fever.   HENT: Negative.     Eyes: Negative.    Respiratory:  Negative for chest tightness, shortness of breath and wheezing.    Cardiovascular:  Negative for chest pain.   Gastrointestinal:  Negative for abdominal pain, constipation and diarrhea.   Genitourinary: Negative.    Integumentary:  Positive for rash.   Neurological:  Negative for dizziness, seizures, syncope, headaches and memory loss.   Psychiatric/Behavioral: Negative.         Objective:      Physical Exam  Vitals reviewed.   Constitutional:       General: She is not in acute distress.     Appearance: Normal appearance. She is not ill-appearing, toxic-appearing or diaphoretic.   HENT:      Head: Normocephalic.      Right Ear: Tympanic membrane, ear canal and  external ear normal. There is no impacted cerumen.      Left Ear: Tympanic membrane, ear canal and external ear normal. There is no impacted cerumen.      Nose: Nose normal. No congestion or rhinorrhea.      Mouth/Throat:      Mouth: Mucous membranes are dry.      Pharynx: No oropharyngeal exudate or posterior oropharyngeal erythema.   Cardiovascular:      Rate and Rhythm: Normal rate and regular rhythm.      Pulses: Normal pulses.      Heart sounds: Normal heart sounds. No murmur heard.    No friction rub. No gallop.   Pulmonary:      Effort: Pulmonary effort is normal. No respiratory distress.      Breath sounds: Normal breath sounds. No stridor. No wheezing, rhonchi or rales.   Chest:      Chest wall: No tenderness.   Abdominal:      Palpations: Abdomen is soft.      Tenderness: There is no abdominal tenderness.   Skin:     Findings: Erythema and rash present. Rash is macular and papular.          Neurological:      Mental Status: She is alert.       Assessment:       Problem List Items Addressed This Visit       Hypertension    Relevant Orders    Comprehensive Metabolic Panel    TSH    T4, Free     Other Visit Diagnoses       Dermatitis    -  Primary    Relevant Orders    CBC Auto Differential    Hemoglobin A1C    Sedimentation rate    LEXUS Screen w/Reflex    Wellness examination        Relevant Orders    Lipid Panel              Plan:       Dermatitis  -     CBC Auto Differential; Future; Expected date: 01/31/2023  -     Hemoglobin A1C; Future; Expected date: 01/31/2023  -     Sedimentation rate; Future; Expected date: 01/31/2023  -     LEXUS Screen w/Reflex; Future; Expected date: 01/31/2023    Hypertension, unspecified type  -     Comprehensive Metabolic Panel; Future; Expected date: 01/31/2023  -     TSH; Future; Expected date: 01/31/2023  -     T4, Free; Future; Expected date: 01/31/2023    Wellness examination  -     Lipid Panel; Future; Expected date: 01/31/2023       I spent 30 minutes on this encounter,  time includes face-to-face, chart review, documentation, test review and orders.

## 2023-02-01 ENCOUNTER — PATIENT OUTREACH (OUTPATIENT)
Dept: ADMINISTRATIVE | Facility: HOSPITAL | Age: 68
End: 2023-02-01
Payer: COMMERCIAL

## 2023-02-01 LAB — ANA SER QL IF: NORMAL

## 2023-02-08 ENCOUNTER — CLINICAL SUPPORT (OUTPATIENT)
Dept: FAMILY MEDICINE | Facility: CLINIC | Age: 68
End: 2023-02-08
Payer: COMMERCIAL

## 2023-02-08 DIAGNOSIS — Z01.30 BP CHECK: Primary | ICD-10-CM

## 2023-02-08 NOTE — PROGRESS NOTES
Huyen Gracia 67 y.o. female is here today for Blood Pressure check.   History of HTN yes.    Review of patient's allergies indicates:   Allergen Reactions    Morphine Other (See Comments)     Hallucinations    Penicillins Hives    Sulfa (sulfonamide antibiotics) Hives     Creatinine   Date Value Ref Range Status   01/31/2023 0.9 0.5 - 1.4 mg/dL Final     Sodium   Date Value Ref Range Status   01/31/2023 140 136 - 145 mmol/L Final     Potassium   Date Value Ref Range Status   01/31/2023 4.5 3.5 - 5.1 mmol/L Final   ]  Patient verifies taking blood pressure medications on a regular basis at the same time of the day.     Current Outpatient Medications:     baclofen (LIORESAL) 10 MG tablet, Take 10 mg by mouth 3 (three) times daily as needed., Disp: , Rfl:     butalbital-acetaminophen-caffeine -40 mg (FIORICET, ESGIC) -40 mg per tablet, TAKE 1 TABLET BY MOUTH 3 TIMES A DAY AS NEEDED **MUST LAST 30 DAYS**, Disp: 60 tablet, Rfl: 2    cetirizine (ZYRTEC) 10 MG tablet, Take 10 mg by mouth once daily., Disp: , Rfl:     clobetasoL (TEMOVATE) 0.05 % external solution, Apply topically 2 (two) times daily. Apply a very small amount to the affected area on the leg twice a day as needed., Disp: 50 mL, Rfl: 1    cloNIDine (CATAPRES) 0.1 MG tablet, TAKE ONE TABLET BY MOUTH EVERY EVENING, Disp: 30 tablet, Rfl: 11    cyclobenzaprine (FLEXERIL) 10 MG tablet, Take 10 mg by mouth 3 (three) times daily as needed for Muscle spasms., Disp: , Rfl:     dicyclomine (BENTYL) 20 mg tablet, Take 1 tablet (20 mg total) by mouth every 6 (six) hours., Disp: 120 tablet, Rfl: 3    diphenhydrAMINE (BENADRYL) 50 MG capsule, Take 50 mg by mouth nightly as needed for Insomnia., Disp: , Rfl:     diphenoxylate-atropine 2.5-0.025 mg (LOMOTIL) 2.5-0.025 mg per tablet, TAKE 1 TABLET BY MOUTH 4 TIMES DAILY AS NEEDED., Disp: 30 tablet, Rfl: 0    enalapril (VASOTEC) 2.5 MG tablet, TAKE 1 TABLET (2.5 MG TOTAL) BY MOUTH ONCE DAILY., Disp: 90 tablet,  Rfl: 3    estradiol (ESTRACE) 1 MG tablet, Take 2 mg by mouth once daily., Disp: , Rfl: 11    felodipine (PLENDIL) 10 MG 24 hr tablet, TAKE 1 TABLET BY MOUTH ONCE DAILY., Disp: 30 tablet, Rfl: 11    gabapentin (NEURONTIN) 300 MG capsule, Take 600 mg by mouth every evening., Disp: , Rfl:     hyoscyamine (LEVSIN/SL) 0.125 mg Subl, Dissolve 1 to 2 tablets under tongue (or chew 2) every three to four hours as needed to relieve esophagus spasms., Disp: 30 tablet, Rfl: 6    melatonin 10 mg Tab, Take 1 tablet by mouth nightly., Disp: , Rfl:     meloxicam (MOBIC) 7.5 MG tablet, Take 7.5 mg by mouth once daily., Disp: , Rfl:     ondansetron (ZOFRAN) 4 MG tablet, TAKE 1 TABLET (4 MG TOTAL) BY MOUTH EVERY 6 (SIX) HOURS AS NEEDED FOR NAUSEA., Disp: 30 tablet, Rfl: 2    pantoprazole (PROTONIX) 40 MG tablet, TAKE 1 TABLET BY MOUTH BEFORE BREAKFAST., Disp: 90 tablet, Rfl: 3    traMADol (ULTRAM) 50 mg tablet, TAKE 1 TABLET BY MOUTH 3 TIMES A DAY AS NEEDED FOR PAIN, Disp: 60 tablet, Rfl: 2    triamcinolone acetonide 0.1% (KENALOG) 0.1 % ointment, Apply topically 2 (two) times daily., Disp: 45 g, Rfl: 3  Does patient have record of home blood pressure readings yes. Readings have been averaging 157/77.   Last dose of blood pressure medication was taken at 6 am.  Patient is asymptomatic.   Complains of na.    140/64  , 52  .    Blood pressure reading after 15 minutes was 120/69, Pulse 52.  JOHN Moran notified.

## 2023-03-21 ENCOUNTER — TELEPHONE (OUTPATIENT)
Dept: GASTROENTEROLOGY | Facility: CLINIC | Age: 68
End: 2023-03-21
Payer: COMMERCIAL

## 2023-03-21 NOTE — TELEPHONE ENCOUNTER
Called and spoke with the patient, patient's procedure was rescheduled with her as Dr. Frank is out of town that week, patient states that she has her prep instructions at home, patient verbalized understanding of this.   Detail Level: Generalized Quality 128: Preventive Care And Screening: Body Mass Index (Bmi) Screening And Follow-Up Plan: BMI is documented within normal parameters and no follow-up plan is required. Quality 431: Preventive Care And Screening: Unhealthy Alcohol Use - Screening: Patient screened for unhealthy alcohol use using a single question and scores less than 2 times per year Quality 265: Biopsy Follow-Up: Biopsy results reviewed, communicated, tracked, and documented Quality 130: Documentation Of Current Medications In The Medical Record: Current Medications Documented Quality 226: Preventive Care And Screening: Tobacco Use: Screening And Cessation Intervention: Patient screened for tobacco use and is an ex/non-smoker

## 2023-04-04 PROBLEM — Z82.49 FAMILY HISTORY OF CARDIOVASCULAR DISEASE: Status: ACTIVE | Noted: 2023-04-04

## 2023-04-26 DIAGNOSIS — K52.9 GE (GASTROENTERITIS): ICD-10-CM

## 2023-04-27 DIAGNOSIS — K58.9 IRRITABLE BOWEL SYNDROME WITHOUT DIARRHEA: ICD-10-CM

## 2023-04-27 RX ORDER — HYDROXYZINE HYDROCHLORIDE 25 MG/1
TABLET, FILM COATED ORAL
Qty: 30 TABLET | Refills: 2 | Status: SHIPPED | OUTPATIENT
Start: 2023-04-27

## 2023-04-27 RX ORDER — DIPHENOXYLATE HYDROCHLORIDE AND ATROPINE SULFATE 2.5; .025 MG/1; MG/1
TABLET ORAL
Qty: 30 TABLET | Refills: 1 | Status: SHIPPED | OUTPATIENT
Start: 2023-04-27 | End: 2023-08-20

## 2023-04-27 RX ORDER — DICYCLOMINE HYDROCHLORIDE 20 MG/1
20 TABLET ORAL EVERY 6 HOURS
Qty: 120 TABLET | Refills: 2 | Status: SHIPPED | OUTPATIENT
Start: 2023-04-27 | End: 2023-07-23

## 2023-07-14 ENCOUNTER — TELEPHONE (OUTPATIENT)
Dept: GASTROENTEROLOGY | Facility: CLINIC | Age: 68
End: 2023-07-14
Payer: COMMERCIAL

## 2023-07-14 ENCOUNTER — TELEPHONE (OUTPATIENT)
Dept: SURGERY | Facility: HOSPITAL | Age: 68
End: 2023-07-14
Payer: COMMERCIAL

## 2023-07-14 NOTE — TELEPHONE ENCOUNTER
Returned call to the patient, patient states that she has her arrival time and her prep instructions.

## 2023-07-14 NOTE — TELEPHONE ENCOUNTER
----- Message from Hernandez Arriaga sent at 7/14/2023 11:13 AM CDT -----  Type:  Patient Returning Call    Who Called:  pt  Who Left Message for Patient:  Eun  Does the patient know what this is regarding?:  yes  Best Call Back Number:  [phone] (home)     Additional Information:  please call and advise--thank you

## 2023-07-14 NOTE — TELEPHONE ENCOUNTER
Good morning,     Ms. Gracia states she does not have prep instructions for her EGD/Colonoscopy on Monday with Dr. Frank. Please contact her to discuss.     Thank you!

## 2023-07-14 NOTE — TELEPHONE ENCOUNTER
Called and spoke with the patient, patient was given the prep instructions for Miralax prep, patient verbalized unzed understanding of this.

## 2023-07-16 NOTE — H&P
History & Physical - Short Stay  Gastroenterology      SUBJECTIVE:     Procedure: Gastroscopy and Colonoscopy    Chief Complaint/Indication for Procedure:  GERD.  Pos Cologuard.    History of Present Illness:     See PCP's OV note that generated referral:  Office Visit   6/28/2022  [facility]    Demetrius De La Rosa III, PA-C  Internal Medicine Hypertension, unspecified type +5 more  Dx Follow-up  Reason for Visit     Progress Notes    Demetrius De La Rosa III, PA-C at 6/28/2022  9:00 AM    Status: Signed   Subjective:       Patient ID: Huyen Gracia is a 66 y.o. female.     Chief Complaint: Follow-up     Patient is a 67 yo female coming in for a check up. She was seen 1 month ago for edema. She was placed on lasix, but she feels the pharmacy just gave her a refill on her dyazide.         Edema  This is a recurrent problem. The current episode started more than 1 month ago. The problem occurs daily. The problem has been waxing and waning. Pertinent negatives include no abdominal pain, chest pain, chills, fatigue, headaches or neck pain. 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 Treatments tried: avoiding salt.   Hypertension  This is a chronic problem. The current episode started more than 1 year ago. The problem is unchanged. The problem is controlled. Associated symptoms include peripheral edema. Pertinent negatives include no anxiety, blurred vision, chest pain, headaches, malaise/fatigue, neck pain, orthopnea, palpitations, PND, shortness of breath or sweats. There are no associated agents to hypertension. Risk factors for coronary artery disease include post-menopausal state. Past treatments include alpha 1 blockers, calcium channel blockers, diuretics and lifestyle changes. The current treatment provides moderate improvement.     Review of Systems   Constitutional: Negative for activity change, chills, fatigue and malaise/fatigue.   Eyes: Negative for blurred vision.   Respiratory:  Negative for chest tightness and shortness of breath.    Cardiovascular: Negative for chest pain, palpitations, orthopnea and PND.   Gastrointestinal: Negative for abdominal pain.     Assessment:           Problem List Items Addressed This Visit      Pedal edema     Relevant Medications     furosemide (LASIX) 20 MG tablet     Other Relevant Orders     US Lower Extremity Veins Bilateral     Irritable bowel syndrome     Hypertension - Primary     GERD (gastroesophageal reflux disease)               Other Visit Diagnoses      GE (gastroenteritis)         Relevant Medications     diphenoxylate-atropine 2.5-0.025 mg (LOMOTIL) 2.5-0.025 mg per tablet     Screening for colon cancer         Relevant Orders     Cologuard Screening (Multitarget Stool DNA)           Plan:       Hypertension, unspecified type     Gastroesophageal reflux disease, unspecified whether esophagitis present     Pedal edema  -     furosemide (LASIX) 20 MG tablet; Take 1 tablet (20 mg total) by mouth once daily.  Dispense: 90 tablet; Refill: 3  -     US Lower Extremity Veins Bilateral; Future; Expected date: 06/28/2022     Irritable bowel syndrome, unspecified type     GE (gastroenteritis)  -     diphenoxylate-atropine 2.5-0.025 mg (LOMOTIL) 2.5-0.025 mg per tablet; Take 1 tablet by mouth 4 (four) times daily as needed.  Dispense: 30 tablet; Refill: 5     Screening for colon cancer  -     Cologuard Screening (Multitarget Stool DNA); Future; Expected date: 06/28/2022     Other orders  -     triamcinolone acetonide 0.1% (KENALOG) 0.1 % ointment; Apply topically 2 (two) times daily.  Dispense: 45 g; Refill: 3  -     hyoscyamine (LEVSIN/SL) 0.125 mg Subl; Dissolve 1 to 2 tablets under tongue (or chew 2) every three to four hours as needed to relieve esophagus spasms.  Dispense: 30 tablet; Refill: 6                7/5/2022  Component Ref Range & Units 1 yr ago   Cologuard Result Negative Positive Abnormal       Note  ----- Message from Demetrius De La Rosa III,  CARLIE sent at 7/11/2022  5:09 PM CDT -----  Huyen Gracia     The screening test for colon cancer I positive. I recommend a colonoscopy further evaluated the colon.           Last EGD 6/20/2014    Last Colon 6/25/2012        PTA Medications   Medication Sig    cetirizine (ZYRTEC) 10 MG tablet Take 10 mg by mouth once daily.    cloNIDine (CATAPRES) 0.1 MG tablet TAKE ONE TABLET BY MOUTH EVERY EVENING    dicyclomine (BENTYL) 20 mg tablet TAKE 1 TABLET (20 MG TOTAL) BY MOUTH EVERY 6 (SIX) HOURS.    diphenhydrAMINE (BENADRYL) 50 MG capsule Take 50 mg by mouth nightly as needed for Insomnia.    diphenoxylate-atropine 2.5-0.025 mg (LOMOTIL) 2.5-0.025 mg per tablet TAKE 1 TABLET BY MOUTH 4 TIMES DAILY AS NEEDED.    estradiol (ESTRACE) 1 MG tablet Take 2 mg by mouth once daily.    gabapentin (NEURONTIN) 300 MG capsule Take 600 mg by mouth every evening.    valsartan-hydrochlorothiazide (DIOVAN-HCT) 160-12.5 mg per tablet Take 1 tablet by mouth once daily.    baclofen (LIORESAL) 10 MG tablet Take 10 mg by mouth 3 (three) times daily as needed.    butalbital-acetaminophen-caffeine -40 mg (FIORICET, ESGIC) -40 mg per tablet TAKE 1 TABLET BY MOUTH 3 TIMES A DAY AS NEEDED **MUST LAST 30 DAYS**    clobetasoL (TEMOVATE) 0.05 % external solution Apply topically 2 (two) times daily. Apply a very small amount to the affected area on the leg twice a day as needed.    cyclobenzaprine (FLEXERIL) 10 MG tablet Take 10 mg by mouth 3 (three) times daily as needed for Muscle spasms.    felodipine (PLENDIL) 5 MG 24 hr tablet Take 1 tablet (5 mg total) by mouth once daily.    hydrOXYzine HCL (ATARAX) 25 MG tablet TAKE 1 TABLET BY MOUTH 1 HOUR BEFORE BEDTIME    hyoscyamine (LEVSIN/SL) 0.125 mg Subl Dissolve 1 to 2 tablets under tongue (or chew 2) every three to four hours as needed to relieve esophagus spasms.    melatonin 10 mg Tab Take 1 tablet by mouth nightly.    meloxicam (MOBIC) 7.5 MG tablet Take 7.5 mg by mouth once daily.  "   ondansetron (ZOFRAN) 4 MG tablet TAKE 1 TABLET (4 MG TOTAL) BY MOUTH EVERY 6 (SIX) HOURS AS NEEDED FOR NAUSEA.    pantoprazole (PROTONIX) 40 MG tablet TAKE 1 TABLET BY MOUTH BEFORE BREAKFAST.    traMADol (ULTRAM) 50 mg tablet TAKE 1 TABLET BY MOUTH 3 TIMES A DAY AS NEEDED FOR PAIN    triamcinolone acetonide 0.1% (KENALOG) 0.1 % ointment Apply topically 2 (two) times daily.       Review of patient's allergies indicates:   Allergen Reactions    Morphine Other (See Comments)     Hallucinations    Penicillins Hives    Sulfa (sulfonamide antibiotics) Hives        Past Medical History:   Diagnosis Date    Dysphagia     General anesthetics causing adverse effect in therapeutic use     No Versed/ Pt states "fights" going in and out    GERD (gastroesophageal reflux disease)     History of 2019 novel coronavirus disease (COVID-19)     Hypertension     Lumbar disc disease      Past Surgical History:   Procedure Laterality Date    APPENDECTOMY      BACK SURGERY      "shaved" herniated discs    BACK SURGERY      lumber fusion    CHOLECYSTECTOMY      COLONOSCOPY      COLONOSCOPY W/ POLYPECTOMY  6/25/2012  [facility]    One 1 mm polyp at the hepatic flexure.  TUBULAR ADENOMATOUS POLYP.  The entire examined colon is otherwise normal.    HYSTERECTOMY      KIDNEY SURGERY      Stent     TUBAL LIGATION      UPPER GASTROINTESTINAL ENDOSCOPY  6/24/2008  [facility]    LA Grade A reflux esophagitis.  Esophagus dilated - 51 Fr.  Minimal antritis.  TRAVIS Test  Negative.     UPPER GASTROINTESTINAL ENDOSCOPY  6/25/2012  [facility]    Normal esophagus.  Normal stomach and duodenum.  TRAVIS Test  Negative.     History reviewed. No pertinent family history.  Social History     Tobacco Use    Smoking status: Never    Smokeless tobacco: Never   Substance Use Topics    Alcohol use: Yes     Alcohol/week: 1.0 standard drink     Types: 1 Glasses of wine per week    Drug use: No         OBJECTIVE:     Vital Signs (Most Recent)  Temp: 97.5 °F (36.4 °C) (07/17/23 " "1239)  Pulse: 92 (07/17/23 1239)  Resp: 17 (07/17/23 1239)  BP: (!) 143/88 (07/17/23 1239)  SpO2: 98 % (07/17/23 1239)    Physical Exam:  :  Ht: 4' 11" (149.9 cm)   Wt: 85.3 kg (188 lb)   BMI: 37.97 kg/m² .                                                    GENERAL:  Comfortable, in no acute distress.                                 HEENT EXAM:  Nonicteric.  No adenopathy.  Oropharynx is clear.               NECK:  Supple.                                                               LUNGS:  Clear.                                                               CARDIAC:  Regular rate and rhythm.  S1, S2.  No murmur.                      ABDOMEN:  Obese.  Soft, positive bowel sounds, nontender.  No hepatosplenomegaly or masses.  No rebound or guarding.                                             EXTREMITIES:  No edema.     MENTAL STATUS:  Alert and oriented.    ASSESSMENT/PLAN:     Assessment: GERD.  Pos Cologuard.    Plan: Gastroscopy and Colonoscopy    Anesthesia Plan:   MAC / General Anaesthesia    ASA Grade: ASA 2 - Patient with mild systemic disease with no functional limitations    MALLAMPATI SCORE: II (hard and soft palate, upper portion of tonsils anduvula visible)    "

## 2023-07-17 ENCOUNTER — ANESTHESIA (OUTPATIENT)
Dept: ENDOSCOPY | Facility: HOSPITAL | Age: 68
End: 2023-07-17
Payer: COMMERCIAL

## 2023-07-17 ENCOUNTER — ANESTHESIA EVENT (OUTPATIENT)
Dept: ENDOSCOPY | Facility: HOSPITAL | Age: 68
End: 2023-07-17
Payer: COMMERCIAL

## 2023-07-17 ENCOUNTER — HOSPITAL ENCOUNTER (OUTPATIENT)
Facility: HOSPITAL | Age: 68
Discharge: HOME | End: 2023-07-17
Attending: INTERNAL MEDICINE | Admitting: PHYSICIAN ASSISTANT
Payer: COMMERCIAL

## 2023-07-17 VITALS
TEMPERATURE: 98 F | OXYGEN SATURATION: 99 % | HEIGHT: 59 IN | RESPIRATION RATE: 18 BRPM | HEART RATE: 60 BPM | SYSTOLIC BLOOD PRESSURE: 138 MMHG | DIASTOLIC BLOOD PRESSURE: 70 MMHG | WEIGHT: 188 LBS | BODY MASS INDEX: 37.9 KG/M2

## 2023-07-17 DIAGNOSIS — R19.5 POSITIVE COLORECTAL CANCER SCREENING USING COLOGUARD TEST: ICD-10-CM

## 2023-07-17 PROCEDURE — 43248 EGD GUIDE WIRE INSERTION: CPT | Mod: PO | Performed by: INTERNAL MEDICINE

## 2023-07-17 PROCEDURE — 88305 TISSUE EXAM BY PATHOLOGIST: ICD-10-PCS | Mod: 26,,, | Performed by: PATHOLOGY

## 2023-07-17 PROCEDURE — D9220A PRA ANESTHESIA: ICD-10-PCS | Mod: CRNA,,, | Performed by: NURSE ANESTHETIST, CERTIFIED REGISTERED

## 2023-07-17 PROCEDURE — D9220A PRA ANESTHESIA: Mod: CRNA,,, | Performed by: NURSE ANESTHETIST, CERTIFIED REGISTERED

## 2023-07-17 PROCEDURE — C1769 GUIDE WIRE: HCPCS | Mod: PO | Performed by: INTERNAL MEDICINE

## 2023-07-17 PROCEDURE — 43248 PR EGD, FLEX, W/DILATION OVER GUIDEWIRE: ICD-10-PCS | Mod: 51,,, | Performed by: INTERNAL MEDICINE

## 2023-07-17 PROCEDURE — 88342 IMHCHEM/IMCYTCHM 1ST ANTB: CPT | Mod: PO | Performed by: PATHOLOGY

## 2023-07-17 PROCEDURE — 25000003 PHARM REV CODE 250: Mod: PO | Performed by: NURSE ANESTHETIST, CERTIFIED REGISTERED

## 2023-07-17 PROCEDURE — G0121 COLON CA SCRN NOT HI RSK IND: HCPCS | Mod: KX,,, | Performed by: INTERNAL MEDICINE

## 2023-07-17 PROCEDURE — 88342 IMHCHEM/IMCYTCHM 1ST ANTB: CPT | Mod: 26,,, | Performed by: PATHOLOGY

## 2023-07-17 PROCEDURE — 43239 EGD BIOPSY SINGLE/MULTIPLE: CPT | Mod: 59,PO | Performed by: INTERNAL MEDICINE

## 2023-07-17 PROCEDURE — 88342 CHG IMMUNOCYTOCHEMISTRY: ICD-10-PCS | Mod: 26,,, | Performed by: PATHOLOGY

## 2023-07-17 PROCEDURE — D9220A PRA ANESTHESIA: ICD-10-PCS | Mod: ANES,,, | Performed by: ANESTHESIOLOGY

## 2023-07-17 PROCEDURE — 63600175 PHARM REV CODE 636 W HCPCS: Mod: PO | Performed by: NURSE ANESTHETIST, CERTIFIED REGISTERED

## 2023-07-17 PROCEDURE — 88305 TISSUE EXAM BY PATHOLOGIST: CPT | Mod: 26,,, | Performed by: PATHOLOGY

## 2023-07-17 PROCEDURE — 88305 TISSUE EXAM BY PATHOLOGIST: CPT | Mod: 59,PO | Performed by: PATHOLOGY

## 2023-07-17 PROCEDURE — 37000009 HC ANESTHESIA EA ADD 15 MINS: Mod: PO | Performed by: INTERNAL MEDICINE

## 2023-07-17 PROCEDURE — 43239 EGD BIOPSY SINGLE/MULTIPLE: CPT | Mod: 59,,, | Performed by: INTERNAL MEDICINE

## 2023-07-17 PROCEDURE — 37000008 HC ANESTHESIA 1ST 15 MINUTES: Mod: PO | Performed by: INTERNAL MEDICINE

## 2023-07-17 PROCEDURE — 43239 PR EGD, FLEX, W/BIOPSY, SGL/MULTI: ICD-10-PCS | Mod: 59,,, | Performed by: INTERNAL MEDICINE

## 2023-07-17 PROCEDURE — 63600175 PHARM REV CODE 636 W HCPCS: Mod: PO | Performed by: INTERNAL MEDICINE

## 2023-07-17 PROCEDURE — 43248 EGD GUIDE WIRE INSERTION: CPT | Mod: 51,,, | Performed by: INTERNAL MEDICINE

## 2023-07-17 PROCEDURE — G0121 COLON CA SCRN NOT HI RSK IND: HCPCS | Mod: KX,PO | Performed by: INTERNAL MEDICINE

## 2023-07-17 PROCEDURE — D9220A PRA ANESTHESIA: Mod: ANES,,, | Performed by: ANESTHESIOLOGY

## 2023-07-17 PROCEDURE — 27201012 HC FORCEPS, HOT/COLD, DISP: Mod: PO | Performed by: INTERNAL MEDICINE

## 2023-07-17 PROCEDURE — G0121 COLON CA SCRN NOT HI RSK IND: ICD-10-PCS | Mod: KX,,, | Performed by: INTERNAL MEDICINE

## 2023-07-17 RX ORDER — SODIUM CHLORIDE 0.9 % (FLUSH) 0.9 %
10 SYRINGE (ML) INJECTION
Status: DISCONTINUED | OUTPATIENT
Start: 2023-07-17 | End: 2023-07-17 | Stop reason: HOSPADM

## 2023-07-17 RX ORDER — SODIUM CHLORIDE, SODIUM LACTATE, POTASSIUM CHLORIDE, CALCIUM CHLORIDE 600; 310; 30; 20 MG/100ML; MG/100ML; MG/100ML; MG/100ML
INJECTION, SOLUTION INTRAVENOUS CONTINUOUS
Status: DISCONTINUED | OUTPATIENT
Start: 2023-07-17 | End: 2023-07-17 | Stop reason: HOSPADM

## 2023-07-17 RX ORDER — PROPOFOL 10 MG/ML
VIAL (ML) INTRAVENOUS
Status: DISCONTINUED | OUTPATIENT
Start: 2023-07-17 | End: 2023-07-17

## 2023-07-17 RX ORDER — PROPOFOL 10 MG/ML
VIAL (ML) INTRAVENOUS CONTINUOUS PRN
Status: DISCONTINUED | OUTPATIENT
Start: 2023-07-17 | End: 2023-07-17

## 2023-07-17 RX ORDER — LIDOCAINE HYDROCHLORIDE 20 MG/ML
INJECTION INTRAVENOUS
Status: DISCONTINUED | OUTPATIENT
Start: 2023-07-17 | End: 2023-07-17

## 2023-07-17 RX ADMIN — LIDOCAINE HYDROCHLORIDE 100 MG: 20 INJECTION INTRAVENOUS at 02:07

## 2023-07-17 RX ADMIN — PROPOFOL 50 MG: 10 INJECTION, EMULSION INTRAVENOUS at 02:07

## 2023-07-17 RX ADMIN — SODIUM CHLORIDE, POTASSIUM CHLORIDE, SODIUM LACTATE AND CALCIUM CHLORIDE: 600; 310; 30; 20 INJECTION, SOLUTION INTRAVENOUS at 12:07

## 2023-07-17 RX ADMIN — PROPOFOL 40 MG: 10 INJECTION, EMULSION INTRAVENOUS at 02:07

## 2023-07-17 RX ADMIN — PROPOFOL 100 MG: 10 INJECTION, EMULSION INTRAVENOUS at 02:07

## 2023-07-17 RX ADMIN — PROPOFOL 75 MCG/KG/MIN: 10 INJECTION, EMULSION INTRAVENOUS at 02:07

## 2023-07-17 NOTE — TRANSFER OF CARE
"Anesthesia Transfer of Care Note    Patient: Huyen Gracia    Procedure(s) Performed: Procedure(s) (LRB):  EGD (ESOPHAGOGASTRODUODENOSCOPY) (N/A)  COLONOSCOPY (N/A)    Patient location: PACU    Anesthesia Type: general    Transport from OR: Transported from OR on room air with adequate spontaneous ventilation    Post pain: adequate analgesia    Post assessment: no apparent anesthetic complications and tolerated procedure well    Post vital signs: stable    Level of consciousness: responds to stimulation    Nausea/Vomiting: no nausea/vomiting    Complications: none    Transfer of care protocol was followed      Last vitals:   Visit Vitals  BP (!) 143/88 (BP Location: Right arm, Patient Position: Lying)   Pulse 92   Temp 36.4 °C (97.5 °F) (Skin)   Resp 17   Ht 4' 11" (1.499 m)   Wt 85.3 kg (188 lb)   SpO2 98%   Breastfeeding No   BMI 37.97 kg/m²     "

## 2023-07-17 NOTE — PATIENT INSTRUCTIONS
Recovery After Procedural Sedation (Adult)   You have been given medicine by vein to make you sleep during your surgery. This may have included both a pain medicine and sleeping medicine. Most of the effects have worn off. But you may still have some drowsiness for the next 6 to 8 hours.  Home care  Follow these guidelines when you get home:  For the next 8 hours, you should be watched by a responsible adult. This person should make sure your condition is not getting worse.  Don't drink any alcohol for the next 24 hours.  Don't drive, operate dangerous machinery, or make important business or personal decisions during the next 24 hours.  To prevent injury or falls, use caution when standing and walking for at least 24 hours after your procedure.  Note: Your healthcare provider may tell you not to take any medicine by mouth for pain or sleep in the next 4 hours. These medicines may react with the medicines you were given in the hospital. This could cause a much stronger response than usual.  Follow-up care  Follow up with your healthcare provider if you are not alert and back to your usual level of activity within 12 hours.  When to seek medical advice  Call your healthcare provider right away if any of these occur:  Drowsiness gets worse  Weakness or dizziness gets worse  Repeated vomiting  You can't be awakened  Fever  New rash  Creditable last reviewed this educational content on 9/1/2019  © 8151-3746 [employer]. [address] [place] [address], [place] [address]. All rights reserved. This information is not intended as a substitute for professional medical care. Always follow your healthcare professional's instructions         Tips to Control Acid Reflux    To control acid reflux, youll need to make some basic diet and lifestyle changes. The simple steps outlined below may be all youll need to ease discomfort.  Watch what you eat  Avoid fatty foods and spicy foods.  Eat fewer acidic foods, such as citrus  and tomato-based foods. These can increase symptoms.  Limit drinking alcohol, caffeine, and fizzy beverages. All increase acid reflux.  Try limiting chocolate, peppermint, and spearmint. These can worsen acid reflux in some people.  Watch when you eat  Avoid lying down for 3 hours after eating.  Do not snack before going to bed.  Raise your head  Raising your head and upper body by 4 to 6 inches helps limit reflux when youre lying down. Put blocks under the head of your bed frame to raise it.  Other changes  Lose weight, if you need to  Dont exercise near bedtime  Avoid tight-fitting clothes  Limit aspirin and ibuprofen  Stop smoking   Date Last Reviewed: 7/1/2016  © 7064-6208 [employer]. [address] [place] [address], [place] [address]. All rights reserved. This information is not intended as a substitute for professional medical care. Always follow your healthcare professional's instructions.         High-Fiber Diet  Fiber is in fruits, vegetables, cereals, and grains. Fiber passes through your body undigested. A high-fiber diet helps food move through your intestinal tract. The added bulk is helpful in preventing constipation. In people with diverticulosis, fiber helps clean out the pouches along the colon wall. It also prevents new pouches from forming. A high-fiber diet reduces the risk of colon cancer. It also lowers blood cholesterol and prevents high blood sugar in people with diabetes.    The fiber-rich foods listed below should be part of your diet. If you are not used to high-fiber foods, start with 1 or 2 foods from this list. Every 3 to 4 days add a new one to your diet. Do this until you are eating 4 high-fiber foods per day. This should give you 20 to 35 grams of fiber a day. It is also important to drink a lot of water when you are on this diet. You should have 6 to 8 glasses of water a day. Water makes the fiber swell and increases the benefit.  Foods high in dietary fiber  The following  foods are high in dietary fiber:  Breads. Breads made with 100% whole-wheat flour; tai, wheat, or rye crackers; whole-grain tortillas, bran muffins.  Cereals. Whole-grain and bran cereals with bran (shredded wheat, wheat flakes, raisin bran, corn bran); oatmeal, rolled oats, granola, and brown rice.  Fruits. Fresh fruits and their edible skins (pears, prunes, raisins, berries, apples, and apricots); bananas, citrus fruit, mangoes, pineapple; and prune juice.  Nuts. Any nuts and seeds.  Vegetables. Best served raw or lightly cooked. All types, especially: green peas, celery, eggplant, potatoes, spinach, broccoli, Ruth sprouts, winter squash, carrots, cauliflower, soybeans, lentils, and fresh and dried beans of all kinds.  Other. Popcorn, any spices.  Date Last Reviewed: 8/1/2016  © 1402-5473 [employer]. [address] [place] [address]. All rights reserved. This information is not intended as a substitute for professional medical care. Always follow your healthcare professional's instructions.

## 2023-07-17 NOTE — [employer] PATIENT INSTRUCTIONS
Discharge Summary/Instructions for after Colonoscopy without   Biopsy/Polypectomy  Huyen Gracia    Monday, July 17, 2023  Valente Frank MD  RESTRICTIONS ON ACTIVITY:  - Do not drive a car or operate machinery until the day after the procedure.      - The following day: return to full activity including work.  - For  3 days: No heavy lifting, straining or running.  - Diet: You may eat solid foods, but no gassy foods (i.e. beans, broccoli,   cabbage, etc).  TREATMENT FOR COMMON SIDE EFFECTS:  - Mild abdominal pain and bloating or excessive gas: rest, eat lightly and   use a heating pad.  SYMPTOMS TO WATCH FOR AND REPORT TO YOUR PHYSICIAN:  1. Severe abdominal pain.  2. Fever within 24 hours after a procedure.  3. A large amount of rectal bleeding. (A small amount of blood from the   rectum is not serious, especially if hemorrhoids are present.  3.  Because air was put into your colon during the procedure, expelling   large amounts of air from your rectum is normal.  4.  You may not have a bowel movement for 1-3 days because of the   colonoscopy prep.  This is normal.  5.  Call immediately if you notice any of the following:   Chills and/or fever over 101   Persistent vomiting   Severe abdominal pain, other than gas cramps   Severe chest pain   Black, tarry stools   Any bleeding - exceeding one tablespoon  Your doctor recommends these additional instructions:  You are being discharged to home.   Eat a high fiber diet and eat a low fat diet.   Your physician has recommended a repeat colonoscopy in 8-10 years for   screening purposes.  None  If you have any questions or problems, please call your physician.  EMERGENCY PHONE NUMBER: [phone]  LAB RESULTS: Call in two (2) weeks for lab results, [phone]  ___________________________________________  Nurse Signature  ___________________________________________  Patient/Designated Responsible Party Signature  Valente Frank MD  7/17/2023 3:22:21  PM  This report has been verified and signed electronically.  Dear patient,  As a result of recent federal legislation (The Federal Cures Act), you may   receive lab or pathology results from your procedure in your [facility]   account before your physician is able to contact you. Your physician or   their representative will relay the results to you with their   recommendations at their soonest availability.  Thank you.  [employer]

## 2023-07-17 NOTE — PROVATION PATIENT INSTRUCTIONS
Discharge Summary/Instructions after an Endoscopic Procedure  Patient Name: Huyen Gracia  Patient MRN: [id number]  Patient YOB: 1955 Monday, July 17, 2023  Valente Frank MD  Dear patient,  As a result of recent federal legislation (The Federal Cures Act), you may   receive lab or pathology results from your procedure in your [facility]   account before your physician is able to contact you. Your physician or   their representative will relay the results to you with their   recommendations at their soonest availability.  Thank you,  RESTRICTIONS:  During your procedure today, you received medications for sedation.  These   medications may affect your judgment, balance and coordination.  Therefore,   for 24 hours, you have the following restrictions:   - DO NOT drive a car, operate machinery, make legal/financial decisions,   sign important papers or drink alcohol.    ACTIVITY:  Today: no heavy lifting, straining or running due to procedural   sedation/anesthesia.  The following day: return to full activity including work.  DIET:  Eat and drink normally unless instructed otherwise.     TREATMENT FOR COMMON SIDE EFFECTS:  - Mild abdominal pain, nausea, belching, bloating or excessive gas:  rest,   eat lightly and use a heating pad.  - Sore Throat: treat with throat lozenges and/or gargle with warm salt   water.  - Because air was used during the procedure, expelling large amounts of air   from your rectum or belching is normal.  - If a bowel prep was taken, you may not have a bowel movement for 1-3 days.    This is normal.  SYMPTOMS TO WATCH FOR AND REPORT TO YOUR PHYSICIAN:  1. Abdominal pain or bloating, other than gas cramps.  2. Chest pain.  3. Back pain.  4. Signs of infection such as: chills or fever occurring within 24 hours   after the procedure.  5. Rectal bleeding, which would show as bright red, maroon, or black stools.   (A tablespoon of blood from the rectum is not serious, especially  if   hemorrhoids are present.)  6. Vomiting.  7. Weakness or dizziness.  GO DIRECTLY TO THE NEAREST EMERGENCY ROOM IF YOU HAVE ANY OF THE FOLLOWING:      Difficulty breathing              Chills and/or fever over 101 F   Persistent vomiting and/or vomiting blood   Severe abdominal pain   Severe chest pain   Black, tarry stools   Bleeding- more than one tablespoon   Any other symptom or condition that you feel may need urgent attention  Your doctor recommends these additional instructions:  If any biopsies were taken, your doctors clinic will contact you in 1 to 2   weeks with any results.  Follow an antireflux regimen.  This includes:       - Do not lie down for at least 3 to 4 hours after meals.        - Raise the head of the bed 4 to 6 inches.        - Decrease excess weight.        - Avoid citrus juices and other acidic foods, alcohol, chocolate, mints,   coffee and other caffeinated beverages, carbonated beverages, fatty and   fried foods.        - Avoid tight-fitting clothing.        - Avoid cigarettes and other tobacco products.   Take Protonix (pantoprazole) 40 mg by mouth once a day for 2-3 months.   Return to GI clinic in 2-3 months.  For questions, problems or results please call your physician - Valente Frank MD at Work:  [phone].  EMERGENCY PHONE NUMBER: [phone], LAB RESULTS: [phone]  IF A COMPLICATION OR EMERGENCY SITUATION ARISES AND YOU ARE UNABLE TO REACH   YOUR PHYSICIAN - GO DIRECTLY TO THE EMERGENCY ROOM.  ___________________________________________  Nurse Signature  ___________________________________________  Patient/Designated Responsible Party Signature  Valente Frank MD  7/17/2023 3:14:33 PM  This report has been verified and signed electronically.  Dear patient,  As a result of recent federal legislation (The Federal Cures Act), you may   receive lab or pathology results from your procedure in your [facility]   account before your physician is able to  contact you. Your physician or   their representative will relay the results to you with their   recommendations at their soonest availability.  Thank you.  PROVATION

## 2023-07-17 NOTE — ANESTHESIA PREPROCEDURE EVALUATION
07/17/2023  Huyne Gracia is a 67 y.o., female.    Pre-op Assessment          Review of Systems      Physical Exam  General:  Obesity      Airway/Jaw/Neck:  Airway Findings: Mouth Opening: Normal   Tongue: Normal   General Airway Assessment: Average Mallampati: III  Improves to II with phonation.  TM Distance: Normal, at least 6 cm   Jaw/Neck Findings:  Neck ROM: Normal ROM             Mental Status:  Mental Status Findings:  Alert and Oriented         Anesthesia Plan  Type of Anesthesia, risks & benefits discussed:  Anesthesia Type:  general, Gen Natural Airway    Patient's Preference:   Plan Factors:          Intra-op Monitoring Plan:   Intra-op Monitoring Plan Comments:   Post Op Pain Control Plan:   Post Op Pain Control Plan Comments:     Induction:   IV  Beta Blocker:         Informed Consent: Informed consent signed with the Patient and all parties understand the risks and agree with anesthesia plan.  All questions answered.  Anesthesia consent signed with patient.  ASA Score: 3     Day of Surgery Review of History & Physical:              Ready For Surgery From Anesthesia Perspective.           Physical Exam  General: Obesity    Airway:  Mallampati: III / II  Mouth Opening: Normal  TM Distance: Normal, at least 6 cm  Tongue: Normal  Neck ROM: Normal ROM          Anesthesia Plan  Type of Anesthesia, risks & benefits discussed:    Anesthesia Type: general, Gen Natural Airway  Induction:  IV  Informed Consent: Informed consent signed with the Patient and all parties understand the risks and agree with anesthesia plan.  All questions answered.   ASA Score: 3    Ready For Surgery From Anesthesia Perspective.       .

## 2023-07-17 NOTE — BRIEF OP NOTE
Discharge Note  Short Stay      SUMMARY     Admit Date: 7/17/2023    Attending Physician: Valente Frank Jr., MD     Discharge Physician: Valente Frank Jr., MD    Discharge Date: 7/17/2023 3:23 PM    Final Diagnosis: Screening [Z13.9]    Impression:            - Normal oropharynx.                          - Normal larynx.                          - Normal cricopharyngeus.                          - Normal upper third of esophagus and middle third                          of esophagus.                          - Reflux esophagitis. Biopsied.                          - Z-line regular, 38 cm from the incisors.                          - Normal cardia.                          - A few fundic gland polyps. Resected and                          retrieved.                          - Gastritis. Biopsied.                          - Normal stomach.                          - Normal pylorus.                          - Normal examined duodenum.                          - No endoscopic esophageal abnormality to explain                          patient's dysphagia. Esophagus dilated. Dilated.   Recommendation:        - Perform a colonoscopy as previously scheduled.                          - Discharge patient to home.                          - Observe patient's clinical course following                          today's procedure with therapeutic intervention.                          - Await pathology results.                          - Follow an antireflux regimen.                          - Use Protonix (pantoprazole) 40 mg PO daily for 3                          months.                          - Call the G.I. clinic in 2 weeks for reports (if                          you haven't heard from us sooner) [phone].                          - Return to GI clinic in 3 months.     Impression:            - Non-bleeding internal hemorrhoids.                          - The rectum and recto-sigmoid colon are normal.                           - Mild colonic spasm consistent with irritable                          bowel syndrome.                          - Redundant colon.                          - The examination was otherwise normal.                          - The entire examined colon is normal.                          - The examined portion of the ileum was normal.                          - No specimens collected.   Recommendation:        - Discharge patient to home.                          - High fiber diet and low fat diet.                          - Repeat colonoscopy in 8 years for screening                          purposes.                          - Continue present medications.     Valente Frank MD   7/17/2023       Disposition: HOME OR SELF CARE    Patient Instructions:   Current Discharge Medication List        CONTINUE these medications which have NOT CHANGED    Details   cetirizine (ZYRTEC) 10 MG tablet Take 10 mg by mouth once daily.      cloNIDine (CATAPRES) 0.1 MG tablet TAKE ONE TABLET BY MOUTH EVERY EVENING  Qty: 30 tablet, Refills: 11    Associated Diagnoses: Essential hypertension      dicyclomine (BENTYL) 20 mg tablet TAKE 1 TABLET (20 MG TOTAL) BY MOUTH EVERY 6 (SIX) HOURS.  Qty: 120 tablet, Refills: 2    Associated Diagnoses: Irritable bowel syndrome without diarrhea      diphenhydrAMINE (BENADRYL) 50 MG capsule Take 50 mg by mouth nightly as needed for Insomnia.      diphenoxylate-atropine 2.5-0.025 mg (LOMOTIL) 2.5-0.025 mg per tablet TAKE 1 TABLET BY MOUTH 4 TIMES DAILY AS NEEDED.  Qty: 30 tablet, Refills: 1    Associated Diagnoses: GE (gastroenteritis)      estradiol (ESTRACE) 1 MG tablet Take 2 mg by mouth once daily.  Refills: 11      gabapentin (NEURONTIN) 300 MG capsule Take 600 mg by mouth every evening.      valsartan-hydrochlorothiazide (DIOVAN-HCT) 160-12.5 mg per tablet Take 1 tablet by mouth once daily.  Qty: 90 tablet, Refills: 3    Comments: .      baclofen (LIORESAL) 10 MG tablet Take 10 mg  by mouth 3 (three) times daily as needed.      butalbital-acetaminophen-caffeine -40 mg (FIORICET, ESGIC) -40 mg per tablet TAKE 1 TABLET BY MOUTH 3 TIMES A DAY AS NEEDED **MUST LAST 30 DAYS**  Qty: 60 tablet, Refills: 2      clobetasoL (TEMOVATE) 0.05 % external solution Apply topically 2 (two) times daily. Apply a very small amount to the affected area on the leg twice a day as needed.  Qty: 50 mL, Refills: 1    Associated Diagnoses: Rash and nonspecific skin eruption      cyclobenzaprine (FLEXERIL) 10 MG tablet Take 10 mg by mouth 3 (three) times daily as needed for Muscle spasms.      felodipine (PLENDIL) 5 MG 24 hr tablet Take 1 tablet (5 mg total) by mouth once daily.  Qty: 90 tablet, Refills: 5    Comments: .      hydrOXYzine HCL (ATARAX) 25 MG tablet TAKE 1 TABLET BY MOUTH 1 HOUR BEFORE BEDTIME  Qty: 30 tablet, Refills: 2      hyoscyamine (LEVSIN/SL) 0.125 mg Subl Dissolve 1 to 2 tablets under tongue (or chew 2) every three to four hours as needed to relieve esophagus spasms.  Qty: 30 tablet, Refills: 6      melatonin 10 mg Tab Take 1 tablet by mouth nightly.      meloxicam (MOBIC) 7.5 MG tablet Take 7.5 mg by mouth once daily.      ondansetron (ZOFRAN) 4 MG tablet TAKE 1 TABLET (4 MG TOTAL) BY MOUTH EVERY 6 (SIX) HOURS AS NEEDED FOR NAUSEA.  Qty: 30 tablet, Refills: 2    Comments: ERX. 3 REFILLS APPROVED. GPI:[id number]      pantoprazole (PROTONIX) 40 MG tablet TAKE 1 TABLET BY MOUTH BEFORE BREAKFAST.  Qty: 90 tablet, Refills: 3      traMADol (ULTRAM) 50 mg tablet TAKE 1 TABLET BY MOUTH 3 TIMES A DAY AS NEEDED FOR PAIN  Qty: 60 tablet, Refills: 2      triamcinolone acetonide 0.1% (KENALOG) 0.1 % ointment Apply topically 2 (two) times daily.  Qty: 45 g, Refills: 3             Discharge Procedure Orders (must include Diet, Follow-up, Activity)    Follow Up:  Follow up with PCP as per your routine.  Please follow an anti reflux diet and a high fiber diet.  Activity as tolerated.    No driving  day of procedure.

## 2023-07-18 NOTE — ANESTHESIA POSTPROCEDURE EVALUATION
Anesthesia Post Evaluation    Patient: Huyen Gracia    Procedure(s) Performed: Procedure(s) (LRB):  EGD (ESOPHAGOGASTRODUODENOSCOPY) (N/A)  COLONOSCOPY (N/A)    Final Anesthesia Type: general      Patient location during evaluation: PACU  Patient participation: Yes- Able to Participate  Level of consciousness: awake and alert and oriented  Post-procedure vital signs: reviewed and stable  Pain management: adequate  Airway patency: patent    PONV status at discharge: No PONV  Anesthetic complications: no      Cardiovascular status: blood pressure returned to baseline  Respiratory status: unassisted, spontaneous ventilation and room air  Hydration status: euvolemic  Follow-up not needed.          Vitals Value Taken Time   /70 07/17/23 1545   Temp 36.4 °C (97.5 °F) 07/17/23 1515   Pulse 60 07/17/23 1545   Resp 18 07/17/23 1545   SpO2 99 % 07/17/23 1545         Event Time   Out of Recovery 14:49:00         Pain/Rosalind Score: Rosalind Score: 10 (7/17/2023  3:45 PM)

## 2023-07-22 DIAGNOSIS — K58.9 IRRITABLE BOWEL SYNDROME WITHOUT DIARRHEA: ICD-10-CM

## 2023-07-23 RX ORDER — DICYCLOMINE HYDROCHLORIDE 20 MG/1
20 TABLET ORAL EVERY 6 HOURS
Qty: 120 TABLET | Refills: 2 | Status: SHIPPED | OUTPATIENT
Start: 2023-07-23

## 2023-07-25 LAB
COMMENT: NORMAL
FINAL PATHOLOGIC DIAGNOSIS: NORMAL
GROSS: NORMAL
Lab: NORMAL

## 2023-08-04 RX ORDER — PANTOPRAZOLE SODIUM 40 MG/1
TABLET, DELAYED RELEASE ORAL
Qty: 90 TABLET | Refills: 3 | Status: SHIPPED | OUTPATIENT
Start: 2023-08-04

## 2023-08-08 NOTE — TELEPHONE ENCOUNTER
"Per PCP  "I recommend to send her GI the results of the cologuard and see if GI wants to push the date up. However, a positive cologuard does not mean cancer is present. False positives can be seen. "  "
----- Message from Cherelle Godinez LPN sent at 7/12/2022 11:18 AM CDT -----  Regarding: Colonoscopy  I rec'd an order for a cscope to be scheduled. Pt appears to have a + Cologuard. Dr. Frank is her gastro doc and his next opening is in Oct/Nov. Pt is stating that she was told she may have colon cancer and this is an urgent matter. I want to clarify that this is urgent- as in need to be done immediately, as pt states she was informed, so that I can speak with Dr. Frank to possibly get her scheduled. I did not see any notes stating pt was informed that she should have this done urgently. I want to try and accommodate her as best as I can. Thanks.    
Called and spoke with the patient, patient was educated on cologuard testing, patient was set up for colonoscopy while she is off work for the [facility], patient verbalized understanding of thsi, patient was sent a my chart message with her prep instructions.  
See message.  Next opening for cscope is in Oct/Nov. Patient had + cologuard. Does patient need to have scope done urgently?   
[[id number]]

## 2023-08-17 DIAGNOSIS — K52.9 GE (GASTROENTERITIS): ICD-10-CM

## 2023-08-18 NOTE — TELEPHONE ENCOUNTER
----- Message from Reese James sent at 8/18/2023  8:47 AM CDT -----  Regarding: refill  Type:  RX Refill Request    Who Called: pt    Refill or New Rx:refill    RX Name and Strength:diphenoxylate-atropine 2.5-0.025 mg (LOMOTIL) 2.5-0.025 mg per tablet 30 tablet 1 4/27/2023    Sig: TAKE 1 TABLET BY MOUTH 4 TIMES DAILY AS NEEDED.   Sent to pharmacy as: diphenoxylate-atropine 2.5-0.025 mg (LOMOTIL) 2.5-0.025 mg per tablet         How is the patient currently taking it? (ex. 1XDay):see above    Is this a 30 day or 90 day RX:see above    Preferred Pharmacy with phone number:  [facility] [place] [id number] [facility] [address]  Phone: [phone] Fax: [phone]        Local or Mail Order:Local    Ordering Provider:Estrella Ulrich Call Back Number:[phone]      Additional Information:  Please call to discuss.

## 2023-08-18 NOTE — TELEPHONE ENCOUNTER
RX Name and Strength:diphenoxylate-atropine 2.5-0.025 mg (LOMOTIL) 2.5-0.025 mg per tablet        30 tablet          1          4/27/2023                      Sig: TAKE 1 TABLET BY MOUTH 4 TIMES DAILY AS NEEDED.         Sent to pharmacy as: diphenoxylate-atropine 2.5-0.025 mg (LOMOTIL) 2.5-0.025 mg per tablet      Please review pended medication and clarify dose and instructions  Please advise

## 2023-08-20 RX ORDER — DIPHENOXYLATE HYDROCHLORIDE AND ATROPINE SULFATE 2.5; .025 MG/1; MG/1
1 TABLET ORAL 4 TIMES DAILY PRN
Qty: 30 TABLET | Refills: 2 | Status: SHIPPED | OUTPATIENT
Start: 2023-08-20 | End: 2023-10-10

## 2023-09-20 DIAGNOSIS — Z78.0 MENOPAUSE: ICD-10-CM

## 2023-10-10 DIAGNOSIS — K52.9 GE (GASTROENTERITIS): ICD-10-CM

## 2023-10-10 RX ORDER — DIPHENOXYLATE HYDROCHLORIDE AND ATROPINE SULFATE 2.5; .025 MG/1; MG/1
1 TABLET ORAL 4 TIMES DAILY PRN
Qty: 30 TABLET | Refills: 0 | Status: SHIPPED | OUTPATIENT
Start: 2023-10-10 | End: 2023-11-16

## 2023-11-16 DIAGNOSIS — K52.9 GE (GASTROENTERITIS): ICD-10-CM

## 2023-11-16 RX ORDER — DIPHENOXYLATE HYDROCHLORIDE AND ATROPINE SULFATE 2.5; .025 MG/1; MG/1
1 TABLET ORAL 4 TIMES DAILY PRN
Qty: 30 TABLET | Refills: 0 | Status: SHIPPED | OUTPATIENT
Start: 2023-11-16 | End: 2023-12-26

## 2023-11-27 ENCOUNTER — TELEPHONE (OUTPATIENT)
Dept: GASTROENTEROLOGY | Facility: CLINIC | Age: 68
End: 2023-11-27
Payer: COMMERCIAL

## 2023-11-27 NOTE — TELEPHONE ENCOUNTER
----- Message from Cesia Guthrie sent at 11/27/2023  8:12 AM CST -----  Type:  Sooner Appointment Request    Caller is requesting a sooner appointment.  Caller declined first available appointment listed below.  Caller will not accept being placed on the waitlist and is requesting a message be sent to doctor.    Name of Caller:  patient  When is the first available appointment?  N/a  Symptoms:  gi follow up   Would the patient rather a call back or a response via [facility]? call  Best Call Back Number:  [phone] (home)  Additional Information:

## 2023-11-27 NOTE — TELEPHONE ENCOUNTER
Called and spoke with the patient, patient requested an appointment to discuss IBS meds, an appointment scheduled with the patient, patient verbalized understanding of this.

## 2023-12-26 DIAGNOSIS — K52.9 GE (GASTROENTERITIS): ICD-10-CM

## 2023-12-26 RX ORDER — DIPHENOXYLATE HYDROCHLORIDE AND ATROPINE SULFATE 2.5; .025 MG/1; MG/1
1 TABLET ORAL 4 TIMES DAILY PRN
Qty: 15 TABLET | Refills: 2 | Status: SHIPPED | OUTPATIENT
Start: 2023-12-26 | End: 2024-03-04

## 2024-02-07 ENCOUNTER — PATIENT MESSAGE (OUTPATIENT)
Dept: ADMINISTRATIVE | Facility: HOSPITAL | Age: 69
End: 2024-02-07
Payer: COMMERCIAL

## 2024-02-21 ENCOUNTER — TELEPHONE (OUTPATIENT)
Dept: PHARMACY | Facility: CLINIC | Age: 69
End: 2024-02-21
Payer: COMMERCIAL

## 2024-02-21 NOTE — TELEPHONE ENCOUNTER
Contacted the patient to perform Medication Therapy Management review, specifically in reference to refilling enalapril to improve PDC for HEDIS measurements.   Medication was discontinued

## 2024-03-04 DIAGNOSIS — K52.9 GE (GASTROENTERITIS): ICD-10-CM

## 2024-03-04 RX ORDER — DIPHENOXYLATE HYDROCHLORIDE AND ATROPINE SULFATE 2.5; .025 MG/1; MG/1
1 TABLET ORAL 4 TIMES DAILY PRN
Qty: 15 TABLET | Refills: 0 | Status: SHIPPED | OUTPATIENT
Start: 2024-03-04 | End: 2024-03-25 | Stop reason: SDUPTHER

## 2024-03-22 DIAGNOSIS — K52.9 GE (GASTROENTERITIS): ICD-10-CM

## 2024-03-22 RX ORDER — DIPHENOXYLATE HYDROCHLORIDE AND ATROPINE SULFATE 2.5; .025 MG/1; MG/1
1 TABLET ORAL 4 TIMES DAILY PRN
Qty: 15 TABLET | Refills: 0 | OUTPATIENT
Start: 2024-03-22

## 2024-03-25 ENCOUNTER — OFFICE VISIT (OUTPATIENT)
Dept: PRIMARY CARE CLINIC | Facility: CLINIC | Age: 69
End: 2024-03-25
Payer: COMMERCIAL

## 2024-03-25 VITALS
OXYGEN SATURATION: 97 % | HEIGHT: 59 IN | SYSTOLIC BLOOD PRESSURE: 136 MMHG | HEART RATE: 67 BPM | WEIGHT: 201.5 LBS | DIASTOLIC BLOOD PRESSURE: 70 MMHG | BODY MASS INDEX: 40.62 KG/M2

## 2024-03-25 DIAGNOSIS — I10 HYPERTENSION, UNSPECIFIED TYPE: ICD-10-CM

## 2024-03-25 DIAGNOSIS — K52.9 CHRONIC DIARRHEA: Primary | ICD-10-CM

## 2024-03-25 DIAGNOSIS — E66.01 CLASS 3 SEVERE OBESITY WITH BODY MASS INDEX (BMI) OF 40.0 TO 44.9 IN ADULT, UNSPECIFIED OBESITY TYPE, UNSPECIFIED WHETHER SERIOUS COMORBIDITY PRESENT: ICD-10-CM

## 2024-03-25 DIAGNOSIS — K52.9 GE (GASTROENTERITIS): ICD-10-CM

## 2024-03-25 DIAGNOSIS — Z78.0 ASYMPTOMATIC MENOPAUSE: ICD-10-CM

## 2024-03-25 DIAGNOSIS — K21.9 GASTROESOPHAGEAL REFLUX DISEASE, UNSPECIFIED WHETHER ESOPHAGITIS PRESENT: ICD-10-CM

## 2024-03-25 PROBLEM — E66.813 CLASS 3 SEVERE OBESITY WITH BODY MASS INDEX (BMI) OF 40.0 TO 44.9 IN ADULT: Status: ACTIVE | Noted: 2024-03-25

## 2024-03-25 PROCEDURE — 1101F PT FALLS ASSESS-DOCD LE1/YR: CPT | Mod: CPTII,S$GLB,, | Performed by: PHYSICIAN ASSISTANT

## 2024-03-25 PROCEDURE — 3078F DIAST BP <80 MM HG: CPT | Mod: CPTII,S$GLB,, | Performed by: PHYSICIAN ASSISTANT

## 2024-03-25 PROCEDURE — 1159F MED LIST DOCD IN RCRD: CPT | Mod: CPTII,S$GLB,, | Performed by: PHYSICIAN ASSISTANT

## 2024-03-25 PROCEDURE — 99214 OFFICE O/P EST MOD 30 MIN: CPT | Mod: S$GLB,,, | Performed by: PHYSICIAN ASSISTANT

## 2024-03-25 PROCEDURE — 4010F ACE/ARB THERAPY RXD/TAKEN: CPT | Mod: CPTII,S$GLB,, | Performed by: PHYSICIAN ASSISTANT

## 2024-03-25 PROCEDURE — 3288F FALL RISK ASSESSMENT DOCD: CPT | Mod: CPTII,S$GLB,, | Performed by: PHYSICIAN ASSISTANT

## 2024-03-25 PROCEDURE — 3008F BODY MASS INDEX DOCD: CPT | Mod: CPTII,S$GLB,, | Performed by: PHYSICIAN ASSISTANT

## 2024-03-25 PROCEDURE — 3075F SYST BP GE 130 - 139MM HG: CPT | Mod: CPTII,S$GLB,, | Performed by: PHYSICIAN ASSISTANT

## 2024-03-25 RX ORDER — OXYBUTYNIN CHLORIDE 10 MG/1
TABLET, EXTENDED RELEASE ORAL
COMMUNITY
Start: 2024-02-19

## 2024-03-25 RX ORDER — DIPHENOXYLATE HYDROCHLORIDE AND ATROPINE SULFATE 2.5; .025 MG/1; MG/1
1 TABLET ORAL DAILY PRN
Qty: 30 TABLET | Refills: 11 | Status: SHIPPED | OUTPATIENT
Start: 2024-03-25

## 2024-03-25 NOTE — PROGRESS NOTES
"Subjective     Patient ID: Huyen Gracia is a 68 y.o. female.    Chief Complaint: Annual Exam    Patient is a 67 yo female coming in today for a yearly check up    Patient Active Problem List:     Dysphagia     GERD (gastroesophageal reflux disease)     Bradycardia     Hypertension     Irritable bowel syndrome     Pedal edema     Family history of cardiovascular disease     Positive colorectal cancer screening using Cologuard test     Class 3 severe obesity with body mass index (BMI) of 40.0 to 44.9 in adult     Chronic diarrhea: started after her cholecystectomy. Has tried Colestid with no results. She takes one lomotil in the am and this resolves her issue.   She is followed by Dermatology,  Cardiology, and Gastroenterology    Past Medical History:  No date: Dysphagia  No date: General anesthetics causing adverse effect in therapeutic use      Comment:  No Versed/ Pt states "fights" going in and out  No date: GERD (gastroesophageal reflux disease)  No date: History of 2019 novel coronavirus disease (COVID-19)  No date: Hypertension  No date: Lumbar disc disease  .          Review of Systems   Constitutional:  Negative for chills, fatigue and fever.   HENT: Negative.     Eyes: Negative.    Respiratory:  Negative for chest tightness, shortness of breath and wheezing.    Cardiovascular:  Negative for chest pain, palpitations and leg swelling.   Gastrointestinal:  Negative for abdominal pain and constipation.   Integumentary:  Positive for rash.   Neurological:  Negative for dizziness, weakness, numbness and headaches.          Objective     Physical Exam  Vitals reviewed.   Constitutional:       General: She is not in acute distress.     Appearance: Normal appearance. She is not ill-appearing, toxic-appearing or diaphoretic.   Neck:      Vascular: No carotid bruit.   Cardiovascular:      Rate and Rhythm: Normal rate and regular rhythm.      Pulses: Normal pulses.      Heart sounds: Normal heart sounds. No murmur " heard.     No gallop.   Pulmonary:      Effort: Pulmonary effort is normal. No respiratory distress.      Breath sounds: Normal breath sounds. No stridor. No wheezing, rhonchi or rales.   Chest:      Chest wall: No tenderness.   Abdominal:      General: There is no distension.      Palpations: Abdomen is soft. There is no mass.      Tenderness: There is no abdominal tenderness. There is no right CVA tenderness, left CVA tenderness, guarding or rebound.      Hernia: No hernia is present.   Musculoskeletal:      Cervical back: No rigidity or tenderness.   Lymphadenopathy:      Cervical: No cervical adenopathy.   Neurological:      General: No focal deficit present.      Mental Status: She is alert.   Psychiatric:         Mood and Affect: Mood normal.            Assessment and Plan     1. Chronic diarrhea  diphenoxylate-atropine 2.5-0.025 mg (LOMOTIL) 2.5-0.025 mg per tablet; Take 1 tablet by mouth daily as needed for Diarrhea.  Dispense: 30 tablet; Refill: 11    2. GE (gastroenteritis)  -     diphenoxylate-atropine 2.5-0.025 mg (LOMOTIL) 2.5-0.025 mg per tablet; Take 1 tablet by mouth daily as needed for Diarrhea.  Dispense: 30 tablet; Refill: 11    3. Asymptomatic menopause  -     DXA Bone Density Axial Skeleton 1 or more sites; Future; Expected date: 03/25/2024    4. Hypertension, unspecified type  Stable and controlled. Continue current treatment plan as previously prescribed with your PCP.      5. Gastroesophageal reflux disease, unspecified whether esophagitis present  The current medical regimen is effective;  continue present plan and medications.     6. Class 3 severe obesity with body mass index (BMI) of 40.0 to 44.9 in adult, unspecified obesity type, unspecified whether serious comorbidity present  The patient is asked to make an attempt to improve diet and exercise patterns to aid in medical management of this problem.       I spent 30 minutes on this encounter, time includes face-to-face, chart review,  documentation, test review and orders.             No follow-ups on file.

## 2024-04-22 DIAGNOSIS — I10 ESSENTIAL HYPERTENSION: ICD-10-CM

## 2024-04-22 DIAGNOSIS — I10 HYPERTENSION, UNSPECIFIED TYPE: ICD-10-CM

## 2024-04-22 DIAGNOSIS — R60.0 PEDAL EDEMA: ICD-10-CM

## 2024-04-22 DIAGNOSIS — Z82.49 FAMILY HISTORY OF CARDIOVASCULAR DISEASE: ICD-10-CM

## 2024-04-22 DIAGNOSIS — R00.1 BRADYCARDIA: ICD-10-CM

## 2024-04-22 RX ORDER — CLONIDINE HYDROCHLORIDE 0.1 MG/1
0.1 TABLET ORAL NIGHTLY
Qty: 30 TABLET | Refills: 0 | Status: SHIPPED | OUTPATIENT
Start: 2024-04-22 | End: 2024-05-09

## 2024-04-23 LAB
HBA1C MFR BLD: 5.9 %
LDLC SERPL CALC-MCNC: 128 MG/DL
LIPIDS, HDL CHOLESTEROL: 51
LIPIDS, TOTAL CHOLESTEROL: 207
LIPIDS, TRIGLYCERIDES: 164

## 2024-05-06 DIAGNOSIS — Z01.810 PREOP CARDIOVASCULAR EXAM: Primary | ICD-10-CM

## 2024-05-07 DIAGNOSIS — Z82.49 FAMILY HISTORY OF CARDIOVASCULAR DISEASE: ICD-10-CM

## 2024-05-07 DIAGNOSIS — R60.0 PEDAL EDEMA: ICD-10-CM

## 2024-05-07 DIAGNOSIS — R00.1 BRADYCARDIA: ICD-10-CM

## 2024-05-07 DIAGNOSIS — I10 HYPERTENSION, UNSPECIFIED TYPE: ICD-10-CM

## 2024-05-07 DIAGNOSIS — I10 ESSENTIAL HYPERTENSION: ICD-10-CM

## 2024-05-09 RX ORDER — CLONIDINE HYDROCHLORIDE 0.1 MG/1
0.1 TABLET ORAL NIGHTLY
Qty: 90 TABLET | Refills: 3 | Status: SHIPPED | OUTPATIENT
Start: 2024-05-09

## 2024-05-09 RX ORDER — KETOROLAC TROMETHAMINE 10 MG/1
10 TABLET, FILM COATED ORAL EVERY 6 HOURS PRN
Qty: 20 TABLET | Refills: 0 | Status: SHIPPED | OUTPATIENT
Start: 2024-05-09

## 2024-05-17 ENCOUNTER — OFFICE VISIT (OUTPATIENT)
Dept: PRIMARY CARE CLINIC | Facility: CLINIC | Age: 69
End: 2024-05-17
Payer: COMMERCIAL

## 2024-05-17 VITALS
BODY MASS INDEX: 39.07 KG/M2 | WEIGHT: 193.81 LBS | SYSTOLIC BLOOD PRESSURE: 138 MMHG | DIASTOLIC BLOOD PRESSURE: 62 MMHG | OXYGEN SATURATION: 98 % | HEART RATE: 93 BPM | HEIGHT: 59 IN

## 2024-05-17 DIAGNOSIS — R73.03 PREDIABETES: ICD-10-CM

## 2024-05-17 DIAGNOSIS — I10 HYPERTENSION, UNSPECIFIED TYPE: ICD-10-CM

## 2024-05-17 DIAGNOSIS — K21.9 GASTROESOPHAGEAL REFLUX DISEASE, UNSPECIFIED WHETHER ESOPHAGITIS PRESENT: ICD-10-CM

## 2024-05-17 DIAGNOSIS — Z01.818 PREOP EXAMINATION: Primary | ICD-10-CM

## 2024-05-17 DIAGNOSIS — K52.9 CHRONIC DIARRHEA: ICD-10-CM

## 2024-05-17 PROCEDURE — 3008F BODY MASS INDEX DOCD: CPT | Mod: CPTII,S$GLB,, | Performed by: PHYSICIAN ASSISTANT

## 2024-05-17 PROCEDURE — 3078F DIAST BP <80 MM HG: CPT | Mod: CPTII,S$GLB,, | Performed by: PHYSICIAN ASSISTANT

## 2024-05-17 PROCEDURE — 3288F FALL RISK ASSESSMENT DOCD: CPT | Mod: CPTII,S$GLB,, | Performed by: PHYSICIAN ASSISTANT

## 2024-05-17 PROCEDURE — 1159F MED LIST DOCD IN RCRD: CPT | Mod: CPTII,S$GLB,, | Performed by: PHYSICIAN ASSISTANT

## 2024-05-17 PROCEDURE — 3075F SYST BP GE 130 - 139MM HG: CPT | Mod: CPTII,S$GLB,, | Performed by: PHYSICIAN ASSISTANT

## 2024-05-17 PROCEDURE — 99214 OFFICE O/P EST MOD 30 MIN: CPT | Mod: S$GLB,,, | Performed by: PHYSICIAN ASSISTANT

## 2024-05-17 PROCEDURE — 1101F PT FALLS ASSESS-DOCD LE1/YR: CPT | Mod: CPTII,S$GLB,, | Performed by: PHYSICIAN ASSISTANT

## 2024-05-17 PROCEDURE — 1126F AMNT PAIN NOTED NONE PRSNT: CPT | Mod: CPTII,S$GLB,, | Performed by: PHYSICIAN ASSISTANT

## 2024-05-17 PROCEDURE — 4010F ACE/ARB THERAPY RXD/TAKEN: CPT | Mod: CPTII,S$GLB,, | Performed by: PHYSICIAN ASSISTANT

## 2024-05-17 RX ORDER — DUPILUMAB 300 MG/2ML
INJECTION, SOLUTION SUBCUTANEOUS
COMMUNITY
Start: 2024-04-15

## 2024-05-17 NOTE — PROGRESS NOTES
"Subjective     Patient ID: Huyen Gracia is a 68 y.o. female.    Chief Complaint: Pre-op Exam    Patient is a 67 y/o female who presents for pre-op clearance for a Hardware Removal L4-L5,L5-S1, Pseudoarthrosis Revision L4-L5,Lateral Extracavitary Interbody L3-L4, Laminotomy L3-L4, Poss Facetectomy L3-L4 scheduled 5/30/24. She has an complex surgical history and reports no issues with surgery in the past. No personal or family history of reactions to anesthesia. No recent fever or illness. No nausea, vomiting or diarrhea. No chest pain, shortness of breath or dyspnea. No dysuria, frequency or urgency. She is planning to follow up with her cardiologist next week.      Review of Systems   Constitutional:  Negative for appetite change, chills, fatigue, fever and unexpected weight change.   HENT:  Negative for nasal congestion, mouth sores, rhinorrhea, sinus pressure/congestion and sore throat.    Respiratory:  Negative for cough, chest tightness, shortness of breath, wheezing and stridor.    Cardiovascular:  Negative for chest pain, palpitations, leg swelling and claudication.   Gastrointestinal:  Negative for abdominal distention, abdominal pain, diarrhea, nausea, vomiting and reflux.   Genitourinary:  Negative for difficulty urinating, dysuria, flank pain, frequency and urgency.   Integumentary:  Negative.   Neurological:  Negative for dizziness, syncope, light-headedness and headaches.   Hematological:  Negative for adenopathy. Does not bruise/bleed easily.   Psychiatric/Behavioral:  Negative for behavioral problems, confusion and hallucinations. The patient is not nervous/anxious.        Past Medical History:   Diagnosis Date    Dysphagia     General anesthetics causing adverse effect in therapeutic use     No Versed/ Pt states "fights" going in and out    GERD (gastroesophageal reflux disease)     History of 2019 novel coronavirus disease (COVID-19)     Hypertension     Lumbar disc disease         Past Surgical " "History:   Procedure Laterality Date    APPENDECTOMY      BACK SURGERY      "shaved" herniated discs    BACK SURGERY      lumber fusion    CHOLECYSTECTOMY      COLONOSCOPY      COLONOSCOPY N/A 7/17/2023    Procedure: COLONOSCOPY;  Surgeon: Valente Frank Jr., MD;  Location: [facility];  Service: Endoscopy;  Laterality: N/A;    COLONOSCOPY W/ POLYPECTOMY  6/25/2012  [facility]    One 1 mm polyp at the hepatic flexure.  TUBULAR ADENOMATOUS POLYP.  The entire examined colon is otherwise normal.    ESOPHAGOGASTRODUODENOSCOPY N/A 7/17/2023    Procedure: EGD (ESOPHAGOGASTRODUODENOSCOPY);  Surgeon: Valente Frank Jr., MD;  Location: [facility];  Service: Endoscopy;  Laterality: N/A;    HYSTERECTOMY      KIDNEY SURGERY      Stent     TUBAL LIGATION      UPPER GASTROINTESTINAL ENDOSCOPY  6/24/2008  [facility]    LA Grade A reflux esophagitis.  Esophagus dilated - 51 Fr.  Minimal antritis.  TRAVIS Test  Negative.     UPPER GASTROINTESTINAL ENDOSCOPY  6/25/2012  [facility]    Normal esophagus.  Normal stomach and duodenum.  TRAVIS Test  Negative.           Objective     Physical Exam  Vitals reviewed.   Constitutional:       General: She is not in acute distress.     Appearance: Normal appearance. She is not ill-appearing or diaphoretic.   HENT:      Head: Normocephalic and atraumatic.      Nose: Nose normal.      Mouth/Throat:      Mouth: Mucous membranes are moist.      Pharynx: Oropharynx is clear. No oropharyngeal exudate or posterior oropharyngeal erythema.   Eyes:      Extraocular Movements: Extraocular movements intact.      Conjunctiva/sclera: Conjunctivae normal.      Pupils: Pupils are equal, round, and reactive to light.   Neck:      Vascular: No carotid bruit.   Cardiovascular:      Rate and Rhythm: Normal rate and regular rhythm.      Pulses: Normal pulses.      Heart sounds: Normal heart sounds. No murmur heard.     No friction rub. No gallop.   Pulmonary:      Effort: Pulmonary effort is normal.      Breath sounds: Normal breath " sounds. No wheezing or rales.   Chest:      Chest wall: No tenderness.   Abdominal:      General: Abdomen is flat. Bowel sounds are normal. There is no distension.      Palpations: Abdomen is soft.      Tenderness: There is no abdominal tenderness.   Musculoskeletal:         General: No swelling.      Cervical back: Neck supple. No tenderness.      Right lower leg: No edema.      Left lower leg: No edema.   Lymphadenopathy:      Cervical: No cervical adenopathy.   Skin:     General: Skin is warm and dry.      Capillary Refill: Capillary refill takes less than 2 seconds.   Neurological:      General: No focal deficit present.      Mental Status: She is alert and oriented to person, place, and time.   Psychiatric:         Mood and Affect: Mood normal.         Behavior: Behavior normal.         Thought Content: Thought content normal.         Judgment: Judgment normal.          [facility]  Outside Information  Results  X-Ray Chest 1 View (Order [id number])     X-Ray Chest 1 View  Order: [id number]  Impression      No acute findings.      Electronically signed by Dileep Carlos MD on 5/10/2024 2:27 PM  Narrative    REASON FOR EXAM: [M48.061]-Spinal stenosis, lumbar region without neurogenic claudication / [M54.16]-Radiculopathy, lumbar region    TECHNICAL FACTORS:  One view.    COMPARISON: None    FINDINGS: The lungs are clear. The cardiac silhouette is normal. Pulmonary vasculature is within normal limits. There is no evidence of pleural effusion or pneumothorax. Degenerative changes are present in the spine.  Exam End: 05/10/24 10:01    Specimen Collected: 05/10/24 14:26 Last Resulted: 05/10/24 14:27   Received From: [facility]  Result Received: 05/17/24 07:45    View Encounter        Received Information          05/10/2024 ECG 12 lead Lab in [facility]  Results:  Sinus bradycardia  Otherwise normal ECG  No previous ECGs available  Confirmed by Malick Adams  ([id number]) on 5/10/2024 1:40:08 PM         [facility]  Outside Information    URINALYSIS WITH REFLEX TO MICROSCOPIC  Order: [id number]   Ref Range & Units 7 d ago   Urine type  CCMS   Color, UA  YELLOW   Appearance  CLEAR   Glucose, Urine NEGATIVE mg/dL NEGATIVE   Bilirubin, Urine NEGATIVE mg/dL NEGATIVE   Ketones, Urine NEGATIVE mg/dL NEGATIVE   Specific Gravity, UA 1.005 - 1.030 >=1.030 Abnormal    Blood, Urine NEGATIVE NEGATIVE   pH, Urine 4.5 - 8.0 5.5   Protein, Urine NEGATIVE mg/dL NEGATIVE   Urobilinogen 0.2 - 1.0 [Jack'U]/dL 0.2   Nitrite, UA NEGATIVE NEGATIVE   Leuk. Esterase, Urine NEGATIVE NEGATIVE   Urine comment  SEE BELOW   Comment: Dipstick results do not meet criteria for additional  testing.  Further reflex testing not indicated.   Resulting [facility] [place]         ESTIMATED GLOMERULAR FILTRATION RATE  Order: [id number]   Ref Range & Units 7 d ago   GFR Non  >59 mL/min >60   eGFR African American >59 mL/min >60   Comment:              STAGES OF CHRONIC KIDNEY DISEASE  STAGE          DESCRIPTION           GFR(mL/min/1.73 m2)  3         Moderate decrease GFR            30-59  4           Severe decrease GFR            15-29  5              Kidney Failure         <15 (or dialysis)  Chronic kidney disease is defined as either kidney damage  or GFR <60mL/min/1.73 m2 for >=3 months. Kidney damage is  defined as pathologic abnormalities or markers of damage,  including abnormalities in blood or urine tests or imaging  studies.  GFR is not calculated for patients under the age of 18.   [facility]       PREALBUMIN  Order: [id number]   Ref Range & Units 7 d ago   Prealbumin 17.7 - 36.3 mg/dL 22.0   Resulting [facility] [place]     Specimen Collected: 05/10/24 10:24    Performed by: [facility] Last Resulted: 05/10/24 11:36   Received From: [facility]  Result Received: 05/17/24 07:45    View Encounter         COMPREHENSIVE METABOLIC PANEL  Order:  [id number]  Status: Final result       Next appt: None               Component Ref Range & Units 7 d ago  (5/10/24) 1 yr ago  (1/31/23) 2 yr ago  (5/9/22) 2 yr ago  (1/11/22) 2 yr ago  (5/22/21) 4 yr ago  (3/20/20) 4 yr ago  (3/11/20)   Glucose 65 - 99 mg/dL 96 97 R 80 R 96 R 99 R, CM 98 R,  R, CM   Sodium 136 - 144 mmol/L 140 140 R 139 R 137 R 139 R 137 R 135 Low  R   Potassium 3.6 - 5.1 mmol/L 4.3         Chloride 101 - 111 mmol/L 107 105 R 103 R 102 R 101 R 103 R 98 R   CO2 22 - 32 mmol/L 27 23 R 27 R 24 R 27 R 25 R 29 R   Blood Urea Nitrogen 8 - 20 mg/dL 16 14 R 14 R 12 R 12 R 16 R 14 R   Calcium 8.9 - 10.3 mg/dL 9.7 9.6 R 9.2 R 9.8 R 9.7 R 9.1 R 9.5 R   Creatinine 0.60 - 1.10 mg/dL 0.85 0.9 R 0.8 R 0.9 R 0.69 R 0.69 R 0.83 R   Albumin 3.5 - 4.8 g/dL 3.7 3.7 R 3.6 R 3.8 R 4.2 R 4.2 R 3.8 R   Total Bilirubin 0.4 - 2.0 mg/dL 0.3 Low  0.2 R, CM 0.3 R, CM 0.3 R, CM 0.3 R 0.3 R 0.3 R   Alkaline Phosphatase 28 - 116 U/L 124 High  146 High  R 138 High  R 156 High  R 140 R 131 R 115 R   Total Protein 6.1 - 7.9 g/dL 7.0         ALT 5 - 41 U/L 12 22 R 9 Low  R 13 R 17 R 14 R 13 R   AST 10 - 34 U/L 16 21 R 14 R 17 R 27 R 23 R 23 R   Anion Gap 7 - 16 mmol/L 6 Low  12 R 9 R 11 R 11 R 9 R 8 R   Resulting Agency  formerly [facility]           Protime-INR  Order: [id number]   Ref Range & Units 7 d ago   Protime 10.0 - 13.6 sec 11.5   INR 0.80 - 1.23 0.97   [facility]       APTT  Order: [id number]   Ref Range & Units 7 d ago   PTT 27.0 - 40.2 sec 30.2   Comment: The PTT has not been validated for use in monitoring  unfractionated heparin therapy.  Due to the high sensitivity of our testing reagents, transient inhibitors of  coagulation (lupus-like inhibitors) may falsely prolong PTT in some  instances, especially in pediatric patients. Transient inhibitors are  typically induced by acute infections and/or antibiotics and do not usually  result in clinically significant bleeding.  Additional testing is available  to differentiate between transient inhibitors of coagulation, a factor  deficiency, or a clinically significant factor inhibitor causing a prolonged  PTT.   [facility]  [facility]       [facility]   Specimen: [id number]  Collected: 04/22/2024 09:27  Received: 04/23/2024 05:28  Reported: 04/23/2024 11:08    Nonfasting Lipid Panel, Standard  Cholesterol, Total: 207 (H); Reference Range: < 200 mg/dL  HDL Cholesterol: 51; Reference Range: > OR = 50 mg/dL  Triglycerides 164 (H); Reference Range: < 150 mg/dL  LDL-Cholesterol 128 (H) mg/dL    Hemoglobin A1c  Hemoglobin A1c: 5.9 (H); Reference Range: < 5.7% of total Hgb        Assessment and Plan     1. Preop examination  Patient is a low surgical risk. May proceed as scheduled.     2. Hypertension, unspecified type  The current medical regimen is effective;  continue present plan and medications.     3. Gastroesophageal reflux disease, unspecified whether esophagitis present  The current medical regimen is effective;  continue present plan and medications.     4. Chronic diarrhea  The current medical regimen is effective;  continue present plan and medications.         I spent 30 minutes on this encounter, time includes face-to-face, chart review, documentation, test review and orders.

## 2024-05-22 DIAGNOSIS — R73.03 PREDIABETES: ICD-10-CM

## 2024-07-26 DIAGNOSIS — K58.9 IRRITABLE BOWEL SYNDROME WITHOUT DIARRHEA: ICD-10-CM

## 2024-07-27 RX ORDER — DICYCLOMINE HYDROCHLORIDE 20 MG/1
20 TABLET ORAL EVERY 6 HOURS
Qty: 120 TABLET | Refills: 0 | Status: SHIPPED | OUTPATIENT
Start: 2024-07-27

## 2024-09-17 ENCOUNTER — PATIENT OUTREACH (OUTPATIENT)
Dept: ADMINISTRATIVE | Facility: HOSPITAL | Age: 69
End: 2024-09-17
Payer: COMMERCIAL

## 2024-09-17 NOTE — PROGRESS NOTES
Population Health Chart Review & Patient Outreach Details      Additional [facility] Notes:               Updates Requested / Reviewed:      Updated Care Coordination Note, Care Everywhere, , and Immunizations Reconciliation Completed or Queried: [facility] Topics Overdue:      [facility] Score: 1     Osteoporosis Screening    Influenza Vaccine  Pneumonia Vaccine  Tetanus Vaccine  RSV Vaccine                  Health Maintenance Topic(s) Outreach Outcomes & Actions Taken:    Lab(s) - Outreach Outcomes & Actions Taken  : External Records Uploaded & Care Team Updated if Applicable

## 2024-09-20 ENCOUNTER — PATIENT MESSAGE (OUTPATIENT)
Dept: PRIMARY CARE CLINIC | Facility: CLINIC | Age: 69
End: 2024-09-20
Payer: COMMERCIAL

## 2024-10-08 DIAGNOSIS — K58.9 IRRITABLE BOWEL SYNDROME WITHOUT DIARRHEA: ICD-10-CM

## 2024-10-08 RX ORDER — DICYCLOMINE HYDROCHLORIDE 20 MG/1
TABLET ORAL
Qty: 120 TABLET | Refills: 0 | Status: SHIPPED | OUTPATIENT
Start: 2024-10-08

## 2024-10-08 RX ORDER — PANTOPRAZOLE SODIUM 40 MG/1
TABLET, DELAYED RELEASE ORAL
Qty: 90 TABLET | Refills: 0 | Status: SHIPPED | OUTPATIENT
Start: 2024-10-08

## 2024-10-17 ENCOUNTER — TELEPHONE (OUTPATIENT)
Dept: PHARMACY | Facility: CLINIC | Age: 69
End: 2024-10-17
Payer: COMMERCIAL

## 2024-10-17 NOTE — TELEPHONE ENCOUNTER
[facility]/Population Health Chart Review & Patient Outreach Details For Medication Adherence Project    Reason for Outreach Encounter: 3rd Party payor non-compliance report ([employer], [employer], [facility], etc)  2.  Patient Outreach Method: Reviewed patient chart   3.   Medication in question:   Hypertension Medications               cloNIDine (CATAPRES) 0.1 MG tablet take one tablet by mouth every evening    valsartan (DIOVAN) 160 MG tablet Take 1 tablet (160 mg total) by mouth once daily.                 Enalapril Discontinued.    4.  Reviewed and or Updates Made To: Patient Chart  5. Outreach Outcomes and/or actions taken: Medication discontinued  Additional Notes:

## 2024-11-04 ENCOUNTER — TELEPHONE (OUTPATIENT)
Dept: NEUROLOGY | Facility: CLINIC | Age: 69
End: 2024-11-04
Payer: COMMERCIAL

## 2024-11-04 NOTE — TELEPHONE ENCOUNTER
----- Message from Jessica sent at 11/4/2024 10:58 AM CST -----  Regarding: Request for an appointment  Name of Who is Calling:Huyen            What is the request in detail:Pt is requesting a call back because she needs to schedule an appointment for the first [facility] neuro provider in [place]. She has trebling in her hands and needs to be seen.            Can the clinic reply by [facility]:No            What Number to Call Back if not in [facility]:[phone]

## 2024-11-04 NOTE — TELEPHONE ENCOUNTER
----- Message from Cyndy sent at 11/4/2024  2:13 PM CST -----  Regarding: Sooner Appointment Request  Contact: patient at [phone]  Type:  Sooner Appointment Request    Name of Caller:  patient at [phone]    When is the first available appointment?  Dec 17  Symptoms:  had back surgery at [place], nerves were severed that is causing numbness in left upper part of thigh down to foot which is completely numb    Additional Information:  Please call and advise. Thank you

## 2024-11-04 NOTE — TELEPHONE ENCOUNTER
Spoke with patient and scheduled appointment for left leg numbness with Lulu Ratliff for 11/21/24. Time/date/location provided

## 2024-11-06 DIAGNOSIS — Z12.31 OTHER SCREENING MAMMOGRAM: ICD-10-CM

## 2024-11-20 ENCOUNTER — TELEPHONE (OUTPATIENT)
Dept: NEUROLOGY | Facility: CLINIC | Age: 69
End: 2024-11-20
Payer: COMMERCIAL

## 2024-11-20 NOTE — TELEPHONE ENCOUNTER
Spoke with patient and changed 11/21/24 appointment with Lulu Ratliff to virtual due to Lulu not in clinic on 11/21

## 2024-11-21 ENCOUNTER — TELEPHONE (OUTPATIENT)
Dept: NEUROLOGY | Facility: CLINIC | Age: 69
End: 2024-11-21
Payer: COMMERCIAL

## 2024-11-21 NOTE — TELEPHONE ENCOUNTER
Called patient to reschedule virtual appointment for today due to provider emergency and not in clinic. No answer. Left message to return call.

## 2024-11-22 ENCOUNTER — OFFICE VISIT (OUTPATIENT)
Dept: NEUROLOGY | Facility: CLINIC | Age: 69
End: 2024-11-22
Payer: COMMERCIAL

## 2024-11-22 VITALS
DIASTOLIC BLOOD PRESSURE: 80 MMHG | RESPIRATION RATE: 18 BRPM | SYSTOLIC BLOOD PRESSURE: 120 MMHG | BODY MASS INDEX: 41.01 KG/M2 | WEIGHT: 203.06 LBS | HEART RATE: 92 BPM

## 2024-11-22 DIAGNOSIS — R25.1 TREMOR: Primary | ICD-10-CM

## 2024-11-22 DIAGNOSIS — M21.372 LEFT FOOT DROP: ICD-10-CM

## 2024-11-22 PROCEDURE — 99999 PR PBB SHADOW E&M-EST. PATIENT-LVL V: CPT | Mod: PBBFAC,,, | Performed by: NURSE PRACTITIONER

## 2024-11-22 RX ORDER — FAMOTIDINE 20 MG/1
20 TABLET, FILM COATED ORAL 2 TIMES DAILY
COMMUNITY
Start: 2024-07-08

## 2024-11-22 RX ORDER — FLUCONAZOLE 150 MG/1
TABLET ORAL
COMMUNITY

## 2024-11-22 RX ORDER — ACETAMINOPHEN 500 MG
1000 TABLET ORAL EVERY 6 HOURS PRN
COMMUNITY
Start: 2024-07-08

## 2024-11-22 RX ORDER — DICLOFENAC SODIUM 10 MG/G
GEL TOPICAL 4 TIMES DAILY
COMMUNITY
Start: 2024-10-28

## 2024-11-22 NOTE — ASSESSMENT & PLAN NOTE
Reports of left hand tremor that began while in intensive inpt rehab following lumbar surgery.   Tremor occurred when performing an action. She is left hand dominant  She denies associated pain or paresthesias to the arm but does endorse decreased fine motor  Neuro exam without tremor appreciated today. No PD symptoms whatsoever.   Suspect physiologic tremor due to overuse of muscle groups   This has probably improved.   Meds not warranted  Advised pt to start tracking her tremor in order to identify trends and triggers. Avoid high amount of caffeine

## 2024-11-22 NOTE — PROGRESS NOTES
NEUROLOGY  Outpatient Consultation Visit     [facility]  [address] [facility], [place] [address]  [phone] (office) / [phone] (fax)    Patient Name:  Huyen Gracia  :  1955  MR #:  [id number]  Acct #:  [id number]    Date of Neurology Consult: 2024  Name of Provider: GRAEME Castellon    Other Physicians:  Demetrius De La Rosa III, PA-C (Primary Care Physician); Self, Aaareferral (Referring)      Chief Complaint: Numbness and Gait Problem      History of Present Illness (HPI):  Huyen Gracia is a left handed 69 y.o. female with a PMHX of Gerd, HTN, Lumbar disease      As per EMR, she underwent revision lumbar spine surgery with Dr. Cruz on 24. During surgery, 2 bones spurs were identified and were excised. Two nerves were cut in this process. One nerve was repaired and the other was left as severed due to being under ansthesia for so long. Since then she has had left drop foot. She reports numbness to her lower back, down her lateral leg and into her foot. Every now and then she experiences pain to her leg. She has constant pins and needles.     She has a EMG/NCS study through [facility] . It was suggested she have a repeat EMG/NCS 3-6 months to assess for changes. This is not yet been scheduled.     She did see Ortho for this who suggested she continue wearing the AFO and exercises. It was also suggested she see pain management.       While in inpatient rehab she began noticing left arm/hand shaking in the mornings and mainly when performing actions. She denies associated pain but does endorse decreased fine motor. She may drop items from her hand. There is no tremor in the right hand.   Some of her family members had PD. She does drink 2-3 cups (12-14 oz) of tea daily. She does not drink alcohol.           Past Medical, Surgical, Family & Social History:   Past Medical History:   Diagnosis Date    Dysphagia     General anesthetics causing adverse effect in  "therapeutic use     No Versed/ Pt states "fights" going in and out    GERD (gastroesophageal reflux disease)     History of 2019 novel coronavirus disease (COVID-19)     Hypertension     Lumbar disc disease      Past Surgical History:   Procedure Laterality Date    APPENDECTOMY      BACK SURGERY      "shaved" herniated discs    BACK SURGERY      lumber fusion    CHOLECYSTECTOMY      COLONOSCOPY      COLONOSCOPY N/A 7/17/2023    Procedure: COLONOSCOPY;  Surgeon: Valente Frank Jr., MD;  Location: [facility] ENDO;  Service: Endoscopy;  Laterality: N/A;    COLONOSCOPY W/ POLYPECTOMY  6/25/2012  [facility]    One 1 mm polyp at the hepatic flexure.  TUBULAR ADENOMATOUS POLYP.  The entire examined colon is otherwise normal.    ESOPHAGOGASTRODUODENOSCOPY N/A 7/17/2023    Procedure: EGD (ESOPHAGOGASTRODUODENOSCOPY);  Surgeon: Valente Frank Jr., MD;  Location: [facility] ENDO;  Service: Endoscopy;  Laterality: N/A;    HYSTERECTOMY      KIDNEY SURGERY      Stent     TUBAL LIGATION      UPPER GASTROINTESTINAL ENDOSCOPY  6/24/2008  [facility]    [place] Grade A reflux esophagitis.  Esophagus dilated - 51 Fr.  Minimal antritis.  TRAVIS Test  Negative.     UPPER GASTROINTESTINAL ENDOSCOPY  6/25/2012  [facility]    Normal esophagus.  Normal stomach and duodenum.  TRAVIS Test  Negative.     No family history on file.  Alcohol use:  reports current alcohol use of about 1.0 standard drink of alcohol per week.   (Of note, 0.6 oz = 1 beer or 6 oz = 10 beers).  Tobacco use:  reports that she has never smoked. She has never used smokeless tobacco.  Street drug use:  reports no history of drug use.  Allergies: Morphine, Penicillins, Sulfa (sulfonamide antibiotics), and Adhesive tape-silicones.    Home Medications:     Current Outpatient Medications:     acetaminophen (TYLENOL) 500 MG tablet, Take 1,000 mg by mouth every 6 (six) hours as needed., Disp: , Rfl:     baclofen (LIORESAL) 10 MG tablet, Take 10 mg by mouth 3 (three) times daily as needed., Disp: , Rfl:     " butalbital-acetaminophen-caffeine -40 mg (FIORICET, ESGIC) -40 mg per tablet, TAKE 1 TABLET BY MOUTH 3 TIMES A DAY AS NEEDED **MUST LAST 30 DAYS**, Disp: 60 tablet, Rfl: 2    cetirizine (ZYRTEC) 10 MG tablet, Take 10 mg by mouth once daily., Disp: , Rfl:     clobetasoL (TEMOVATE) 0.05 % external solution, Apply topically 2 (two) times daily. Apply a very small amount to the affected area on the leg twice a day as needed., Disp: 50 mL, Rfl: 1    cloNIDine (CATAPRES) 0.1 MG tablet, take one tablet by mouth every evening, Disp: 90 tablet, Rfl: 3    cyclobenzaprine (FLEXERIL) 10 MG tablet, Take 10 mg by mouth 3 (three) times daily as needed for Muscle spasms., Disp: , Rfl:     diclofenac sodium (VOLTAREN) 1 % Gel, Apply topically 4 (four) times daily., Disp: , Rfl:     dicyclomine (BENTYL) 20 mg tablet, take 1 tablet (20 MILLIGRAM total) by mouth every 6 (six) hours., Disp: 120 tablet, Rfl: 0    diphenoxylate-atropine 2.5-0.025 mg (LOMOTIL) 2.5-0.025 mg per tablet, Take 1 tablet by mouth daily as needed for Diarrhea., Disp: 30 tablet, Rfl: 11    DUPIXENT  mg/2 mL PnIj, , Disp: , Rfl:     estradiol (ESTRACE) 1 MG tablet, Take 2 mg by mouth once daily., Disp: , Rfl: 11    famotidine (PEPCID) 20 MG tablet, Take 20 mg by mouth 2 (two) times daily., Disp: , Rfl:     fluconazole (DIFLUCAN) 150 MG Tab, TAKE ONE TABLET BY MOUTH x1. MAY REPEAT IN 3 DAYS IF NEEDED,, Disp: , Rfl:     gabapentin (NEURONTIN) 300 MG capsule, Take 600 mg by mouth every evening., Disp: , Rfl:     hydrOXYzine HCL (ATARAX) 25 MG tablet, TAKE 1 TABLET BY MOUTH 1 HOUR BEFORE BEDTIME, Disp: 30 tablet, Rfl: 2    hyoscyamine (LEVSIN/SL) 0.125 mg Subl, Dissolve 1 to 2 tablets under tongue (or chew 2) every three to four hours as needed to relieve esophagus spasms., Disp: 30 tablet, Rfl: 6    ketorolac (TORADOL) 10 mg tablet, Take 1 tablet (10 mg total) by mouth every 6 (six) hours as needed for Pain (DO NOT TAKE WHILE TAKING MOBIC)., Disp:  20 tablet, Rfl: 0    melatonin 10 mg Tab, Take 1 tablet by mouth nightly., Disp: , Rfl:     meloxicam (MOBIC) 7.5 MG tablet, Take 7.5 mg by mouth once daily., Disp: , Rfl:     ondansetron (ZOFRAN) 4 MG tablet, TAKE 1 TABLET (4 MG TOTAL) BY MOUTH EVERY 6 (SIX) HOURS AS NEEDED FOR NAUSEA., Disp: 30 tablet, Rfl: 2    oxybutynin (DITROPAN-XL) 10 MG 24 hr tablet, , Disp: , Rfl:     pantoprazole (PROTONIX) 40 MG tablet, TAKE 1 TABLET BY MOUTH BEFORE BREAKFAST., Disp: 90 tablet, Rfl: 0    traMADol (ULTRAM) 50 mg tablet, TAKE 1 TABLET BY MOUTH 3 TIMES A DAY AS NEEDED FOR PAIN, Disp: 60 tablet, Rfl: 2    triamcinolone acetonide 0.1% (KENALOG) 0.1 % ointment, Apply topically 2 (two) times daily., Disp: 45 g, Rfl: 3    valsartan (DIOVAN) 160 MG tablet, Take 1 tablet (160 mg total) by mouth once daily., Disp: 90 tablet, Rfl: 4    Physical Examination:  /80 (BP Location: Left arm, Patient Position: Sitting)   Pulse 92   Resp 18   Wt 92.1 kg (203 lb 0.7 oz)   BMI 41.01 kg/m²     GENERAL:  General appearance: Well, non-toxic appearing.  No apparent distress.  Neck: supple.    MENTAL STATUS:  Alertness, attention span & concentration: normal.  Language: normal.  Orientation to self, place & time:  normal.  Memory, recent & remote: normal.  Fund of knowledge: normal.      SPEECH:  Clear and fluent.  Follows complex commands.      CRANIAL NERVES:  Cranial Nerves II-XII were examined.  II - Visual fields: normal.  III, IV, VI: PERRL, EOMI, No ptosis, No nystagmus.  V - Facial sensation: normal.  VII - Face symmetry & mobility: normal.  VIII - Hearing: normal  IX, X - Palate: mobile & midline.  XI - Shoulder shrug: normal.  XII - Tongue protrusion: normal.        GROSS MOTOR:  Gait & station: uses cane; left leg brace  Tone: normal.  Arms to core: no tremor  Arms extended: no tremor  Abnormal movements: none.  Finger-nose: normal.  Rapid alternating movements: normal.  Pronator drift: normal      MUSCLE STRENGTH:   Hand  "grasp:   - right:5/5   - left:5/5    RIGHT    LEFT   5 Deltoids 5   5 Biceps 5   5 Triceps 5   5 Forearm.Pr. 5        5 Iliopsoas flex    4+   4 Hip Abduct 4   4 Hip Adduct 4   5 Quads 5   5 Hams 5   5 Dorsiflex -   5 Plantar Flex 0   5 Ankle Ryan -   5 Ankle Invert -   5 Toe Ext. -   5 Toe Flex -                 SENSORY:  Light touch: Normal throughout.         Diagnostic Data Reviewed:   I have personally reviewed provider notes, labs and imaging made available to me today.     Imaging:  Reviewed in care everywhere  Cardiac:    Labs:  Lab Results   Component Value Date    WBC 8.58 01/31/2023    HGB 12.2 01/31/2023    HCT 38.0 01/31/2023     01/31/2023    MCV 86 01/31/2023    RDW 13.5 01/31/2023     Lab Results   Component Value Date     06/26/2024    K 4.1 06/26/2024     01/31/2023    CO2 25 06/26/2024    BUN 15 06/26/2024    CREATININE 0.77 06/26/2024    GLU 97 01/31/2023    CALCIUM 8.6 (L) 06/26/2024     Lab Results   Component Value Date    PROT 6 (L) 06/26/2024    ALBUMIN 3.3 (L) 06/26/2024    BILITOT 0.4 06/26/2024    AST 14 06/26/2024    ALKPHOS 141 (H) 06/26/2024    ALT 27 06/26/2024     No results found for: "INR", "PROTIME", "PTT"  Lab Results   Component Value Date    CHOL 194 01/31/2023    HDL 52 01/31/2023    LDLCALC 128 (H) 04/22/2024    TRIG 119 01/31/2023    CHOLHDL 26.8 01/31/2023     Lab Results   Component Value Date    HGBA1C 5.9 (H) 04/22/2024      No results found for: "VPRPVNIH45"  No results found for: "FOLATE"  Lab Results   Component Value Date    TSH 1.252 01/31/2023     No results found for: "VITAMINB1"  No results found for: "RPR"  No results found for: "LEXUS"  No components found for: "HEPATITISCANTIBODY"  No components found for: "HIV 1/2 AG/AB"  No results found for: "CRP"  No components found for: "SEDIMENTATIONRATE"      Assessment and Plan:  Huyen Gracia is a 69 y.o. female.      Problem List Items Addressed This Visit          Neuro    Tremor - Primary    " Current Assessment & Plan     Reports of left hand tremor that began while in intensive inpt rehab following lumbar surgery.   Tremor occurred when performing an action. She is left hand dominant  She denies associated pain or paresthesias to the arm but does endorse decreased fine motor  Neuro exam without tremor appreciated today. No PD symptoms whatsoever.   Suspect physiologic tremor due to overuse of muscle groups   This has probably improved.   Meds not warranted  Advised pt to start tracking her tremor in order to identify trends and triggers. Avoid high amount of caffeine          Left foot drop    Current Assessment & Plan     Chronic problem since her lumbar revision in June.    - reported to have nerve injury during the surgery which resulted in paresthesias and foot drop  EMG/NCS done externally reportedly did confirm this   - will obtain  Pt is currently wearing a leg brace with AFO. She also complains of numbness and constant pins and needles sensation that starts in her back and radiates down her leg.   Agree with PM referral   Also agree with her having repeat EMG/NCS next month to assess for changes overtime.   Consider f/u with Dr. Wu                          Important to note, also  has a past medical history of Dysphagia, General anesthetics causing adverse effect in therapeutic use, GERD (gastroesophageal reflux disease), History of 2019 novel coronavirus disease (COVID-19), Hypertension, and Lumbar disc disease.            The patient will return to clinic as needed        All questions were answered and patient is comfortable with the plan.       Thank you very much for the opportunity to assist in this patient's care.    If you have any questions or concerns, please do not hesitate to contact me at any time.    Sincerely,     GRAEME Castellon  [facility] [place]         I spent a total of 60 minutes on the day of the visit.This includes face to face time and  non-face to face time preparing to see the patient (eg, review of tests), Obtaining and/or reviewing separately obtained history, Documenting clinical information in the electronic or other health record, Independently interpreting resultsand communicating results to the patient/family/caregiver, or Care coordination.

## 2024-11-22 NOTE — ASSESSMENT & PLAN NOTE
Chronic problem since her lumbar revision in June.    - reported to have nerve injury during the surgery which resulted in paresthesias and foot drop  EMG/NCS done externally reportedly did confirm this   - will obtain  Pt is currently wearing a leg brace with AFO. She also complains of numbness and constant pins and needles sensation that starts in her back and radiates down her leg.   Agree with PM referral   Also agree with her having repeat EMG/NCS next month to assess for changes overtime.   Consider f/u with Dr. Wu

## 2024-12-30 DIAGNOSIS — K58.9 IRRITABLE BOWEL SYNDROME WITHOUT DIARRHEA: ICD-10-CM

## 2024-12-30 RX ORDER — PANTOPRAZOLE SODIUM 40 MG/1
TABLET, DELAYED RELEASE ORAL
Qty: 90 TABLET | Refills: 0 | Status: SHIPPED | OUTPATIENT
Start: 2024-12-30

## 2024-12-30 RX ORDER — DICYCLOMINE HYDROCHLORIDE 20 MG/1
TABLET ORAL
Qty: 120 TABLET | Refills: 0 | Status: SHIPPED | OUTPATIENT
Start: 2024-12-30

## 2025-01-20 ENCOUNTER — TELEPHONE (OUTPATIENT)
Dept: PHARMACY | Facility: CLINIC | Age: 70
End: 2025-01-20
Payer: COMMERCIAL

## 2025-01-20 NOTE — TELEPHONE ENCOUNTER
[facility] Chart Review & Patient Outreach Details For Medication Adherence Project    Reason for Outreach Encounter: 3rd Party payor non-compliance report ([employer], [employer], C, etc)  2.  Patient Outreach Method: Reviewed Patient Chart  3.   Medication in question: enalapril   LAST FILLED: n/a  Hypertension Medications               cloNIDine (CATAPRES) 0.1 MG tablet take one tablet by mouth every evening    valsartan (DIOVAN) 160 MG tablet Take 1 tablet (160 mg total) by mouth once daily.              4.  Reviewed and or Updates Made To: Patient Chart  5. Outreach Outcomes and/or actions taken: Medication discontinued    Additional Notes:

## 2025-02-21 DIAGNOSIS — K58.9 IRRITABLE BOWEL SYNDROME WITHOUT DIARRHEA: ICD-10-CM

## 2025-02-21 RX ORDER — BACLOFEN 10 MG/1
10 TABLET ORAL 3 TIMES DAILY
Qty: 90 TABLET | Refills: 0 | Status: SHIPPED | OUTPATIENT
Start: 2025-02-21

## 2025-02-21 RX ORDER — PANTOPRAZOLE SODIUM 40 MG/1
40 TABLET, DELAYED RELEASE ORAL
Qty: 90 TABLET | Refills: 0 | Status: SHIPPED | OUTPATIENT
Start: 2025-02-21

## 2025-02-21 RX ORDER — DICYCLOMINE HYDROCHLORIDE 20 MG/1
TABLET ORAL
Qty: 120 TABLET | Refills: 0 | Status: SHIPPED | OUTPATIENT
Start: 2025-02-21

## 2025-02-25 ENCOUNTER — TELEPHONE (OUTPATIENT)
Dept: PHARMACY | Facility: CLINIC | Age: 70
End: 2025-02-25
Payer: COMMERCIAL

## 2025-02-26 PROBLEM — R55 SYNCOPE AND COLLAPSE: Status: ACTIVE | Noted: 2025-02-26

## 2025-02-26 PROBLEM — E78.5 DYSLIPIDEMIA: Status: ACTIVE | Noted: 2025-02-26

## 2025-02-26 NOTE — TELEPHONE ENCOUNTER
[facility] Chart Review & Patient Outreach Details For Medication Adherence Project    Reason for Outreach Encounter: 3rd Party payor non-compliance report ([employer], [employer], C, etc)  2.  Patient Outreach Method: Reviewed patient chart   3.   Medication in question:    Hypertension Medications              cloNIDine (CATAPRES) 0.1 MG tablet take one tablet by mouth every evening    valsartan (DIOVAN) 160 MG tablet Take 1 tablet (160 mg total) by mouth once daily.                 valsartan  last filled  12/30 for 90 day supply      4.  Reviewed and or Updates Made To: Patient Chart  5. Outreach Outcomes and/or actions taken: Patient filled medication and is on track to be adherent  Additional Notes:       
PACU

## 2025-03-10 ENCOUNTER — PATIENT MESSAGE (OUTPATIENT)
Dept: ADMINISTRATIVE | Facility: HOSPITAL | Age: 70
End: 2025-03-10
Payer: COMMERCIAL

## 2025-05-23 ENCOUNTER — RESULTS FOLLOW-UP (OUTPATIENT)
Dept: GASTROENTEROLOGY | Facility: CLINIC | Age: 70
End: 2025-05-23

## 2025-05-23 ENCOUNTER — PATIENT MESSAGE (OUTPATIENT)
Dept: GASTROENTEROLOGY | Facility: CLINIC | Age: 70
End: 2025-05-23

## 2025-05-23 ENCOUNTER — OFFICE VISIT (OUTPATIENT)
Dept: GASTROENTEROLOGY | Facility: CLINIC | Age: 70
End: 2025-05-23
Payer: COMMERCIAL

## 2025-05-23 ENCOUNTER — HOSPITAL ENCOUNTER (OUTPATIENT)
Dept: RADIOLOGY | Facility: HOSPITAL | Age: 70
Discharge: HOME | End: 2025-05-23
Payer: COMMERCIAL

## 2025-05-23 VITALS
SYSTOLIC BLOOD PRESSURE: 166 MMHG | HEART RATE: 78 BPM | DIASTOLIC BLOOD PRESSURE: 72 MMHG | HEIGHT: 60 IN | WEIGHT: 203.06 LBS | BODY MASS INDEX: 39.87 KG/M2

## 2025-05-23 DIAGNOSIS — R19.7 DIARRHEA, UNSPECIFIED TYPE: ICD-10-CM

## 2025-05-23 DIAGNOSIS — M54.9 LEFT-SIDED BACK PAIN, UNSPECIFIED BACK LOCATION, UNSPECIFIED CHRONICITY: Primary | ICD-10-CM

## 2025-05-23 DIAGNOSIS — M54.9 LEFT-SIDED BACK PAIN, UNSPECIFIED BACK LOCATION, UNSPECIFIED CHRONICITY: ICD-10-CM

## 2025-05-23 PROCEDURE — 74018 RADEX ABDOMEN 1 VIEW: CPT | Mod: TC

## 2025-05-23 PROCEDURE — 74018 RADEX ABDOMEN 1 VIEW: CPT | Mod: 26,,, | Performed by: RADIOLOGY

## 2025-05-23 PROCEDURE — 99999 PR PBB SHADOW E&M-EST. PATIENT-LVL V: CPT | Mod: PBBFAC,,,

## 2025-05-23 RX ORDER — AZITHROMYCIN 250 MG/1
250 TABLET, FILM COATED ORAL
COMMUNITY
Start: 2025-05-02

## 2025-05-23 RX ORDER — CHLORHEXIDINE GLUCONATE ORAL RINSE 1.2 MG/ML
SOLUTION DENTAL
COMMUNITY
Start: 2025-04-17

## 2025-05-23 NOTE — PROGRESS NOTES
"Subjective:       Patient ID: Huyen Gracia is a 69 y.o. female Body mass index is 39.65 kg/m².    Chief Complaint: Abdominal Pain (Radiates from back to the front of abdomen )    This patient is new to me.  Referring Provider: Aaareferral Self for ER f/u and back and abdominal pain.  Established patient of Dr. Frank.     ER visit x 2 for severe back and abdominal pain.  CT x 2 negative except for L ovarian cyst    Reports nothing she's been given has helped with her pain at all. She is taking tylenol and the steroid. Reports this has been going on for 4 weeks now.       Review of Systems   Constitutional:  Positive for activity change. Negative for appetite change, fatigue, fever and unexpected weight change.   HENT:  Negative for sore throat and trouble swallowing.    Respiratory:  Negative for cough and shortness of breath.    Cardiovascular:  Negative for chest pain.   Gastrointestinal:  Positive for abdominal pain. Negative for abdominal distention, anal bleeding, blood in stool, constipation, diarrhea, nausea, rectal pain and vomiting.   Genitourinary:  Positive for flank pain.   Musculoskeletal:  Positive for back pain.       No LMP recorded. Patient has had a hysterectomy.  Past Medical History:   Diagnosis Date    Dysphagia     General anesthetics causing adverse effect in therapeutic use     No Versed/ Pt states "fights" going in and out    GERD (gastroesophageal reflux disease)     History of 2019 novel coronavirus disease (COVID-19)     Hypertension     Lumbar disc disease      Past Surgical History:   Procedure Laterality Date    APPENDECTOMY      BACK SURGERY      "shaved" herniated discs    BACK SURGERY      lumber fusion    CHOLECYSTECTOMY      COLONOSCOPY      COLONOSCOPY N/A 7/17/2023    Procedure: COLONOSCOPY;  Surgeon: Valente Frank Jr., MD;  Location: [facility];  Service: Endoscopy;  Laterality: N/A;    COLONOSCOPY W/ POLYPECTOMY  6/25/2012  [facility]    One 1 mm polyp at the hepatic flexure.  " TUBULAR ADENOMATOUS POLYP.  The entire examined colon is otherwise normal.    ESOPHAGOGASTRODUODENOSCOPY N/A 7/17/2023    Procedure: EGD (ESOPHAGOGASTRODUODENOSCOPY);  Surgeon: Valente Frank Jr., MD;  Location: [facility];  Service: Endoscopy;  Laterality: N/A;    HYSTERECTOMY      KIDNEY SURGERY      Stent     TUBAL LIGATION      UPPER GASTROINTESTINAL ENDOSCOPY  6/24/2008  [facility]    [place] Grade A reflux esophagitis.  Esophagus dilated - 51 Fr.  Minimal antritis.  TRAVIS Test  Negative.     UPPER GASTROINTESTINAL ENDOSCOPY  6/25/2012  [facility]    Normal esophagus.  Normal stomach and duodenum.  TRAVIS Test  Negative.     No family history on file.  Social History[1]  Wt Readings from Last 10 Encounters:   05/23/25 92.1 kg (203 lb 0.7 oz)   05/04/25 90.7 kg (200 lb)   03/26/25 94.3 kg (208 lb)   02/26/25 94.5 kg (208 lb 4.8 oz)   11/22/24 92.1 kg (203 lb 0.7 oz)   05/17/24 87.9 kg (193 lb 12.6 oz)   03/25/24 91.4 kg (201 lb 8 oz)   08/04/23 86.2 kg (190 lb)   07/14/23 85.3 kg (188 lb)   04/04/23 85.7 kg (188 lb 14.4 oz)     Lab Results   Component Value Date    WBC 13.22 (H) 05/04/2025    HGB 11.4 (L) 05/04/2025    HCT 36.6 (L) 05/04/2025    MCV 83 05/04/2025     05/04/2025     CMP  Sodium   Date Value Ref Range Status   05/04/2025 144 136 - 145 mmol/L Final     Potassium   Date Value Ref Range Status   05/04/2025 3.8 3.5 - 5.1 mmol/L Final     Comment:     Anion Gap reference range revised on 4/28/2023     Chloride   Date Value Ref Range Status   05/04/2025 111 (H) 95 - 110 mmol/L Final     CO2   Date Value Ref Range Status   05/04/2025 25 23 - 29 mmol/L Final     Glucose   Date Value Ref Range Status   05/04/2025 83 70 - 110 mg/dL Final     Comment:     The ADA recommends the following guidelines for fasting glucose:    Normal:       less than 100 mg/dL    Prediabetes:  100 mg/dL to 125 mg/dL    Diabetes:     126 mg/dL or higher       BUN   Date Value Ref Range Status   05/04/2025 20 8 - 23 mg/dL Final     Creatinine  "  Date Value Ref Range Status   05/04/2025 0.76 0.50 - 1.40 mg/dL Final     Calcium   Date Value Ref Range Status   05/04/2025 9.0 8.7 - 10.5 mg/dL Final     Total Protein   Date Value Ref Range Status   05/04/2025 7.5 6.0 - 8.4 g/dL Final     Albumin   Date Value Ref Range Status   05/04/2025 3.9 3.5 - 5.2 g/dL Final     Total Bilirubin   Date Value Ref Range Status   05/04/2025 0.3 0.2 - 1.0 mg/dL Final     Alkaline Phosphatase   Date Value Ref Range Status   05/04/2025 158 (H) 40 - 150 U/L Final     AST   Date Value Ref Range Status   05/04/2025 22 10 - 40 U/L Final     ALT   Date Value Ref Range Status   05/04/2025 17 10 - 44 U/L Final     Anion Gap   Date Value Ref Range Status   05/04/2025 8 8 - 16 mmol/L Final     Comment:     Anion Gap reference range revised on 4/28/2023     eGFR if    Date Value Ref Range Status   05/09/2022 >60.0 >60 mL/min/1.73 m^2 Final     eGFR if non    Date Value Ref Range Status   05/09/2022 >60.0 >60 mL/min/1.73 m^2 Final     Comment:     Calculation used to obtain the estimated glomerular filtration  rate (eGFR) is the CKD-EPI equation.        No results found for: "AMYLASE"  No results found for: "LIPASE"  Lab Results   Component Value Date    LIPASERES 16 05/04/2025     Lab Results   Component Value Date    TSH 1.252 01/31/2023       Reviewed prior medical records including radiology report of ER visit [facility] 5/[facility]25, [facility] 2025 & endoscopy history (see surgical history).    Objective:      Physical Exam  Vitals and nursing note reviewed.   Constitutional:       General: She is not in acute distress.     Appearance: She is obese. She is not ill-appearing.   HENT:      Head: Normocephalic and atraumatic.      Mouth/Throat:      Mouth: Mucous membranes are moist.      Pharynx: Oropharynx is clear.   Eyes:      Conjunctiva/sclera: Conjunctivae normal.   Cardiovascular:      Rate and Rhythm: Normal rate and regular rhythm.      Pulses: " Normal pulses.   Pulmonary:      Effort: Pulmonary effort is normal. No respiratory distress.   Abdominal:      General: Abdomen is flat. There is no distension.      Palpations: Abdomen is soft.      Tenderness: There is no abdominal tenderness.   Skin:     General: Skin is warm and dry.      Capillary Refill: Capillary refill takes 2 to 3 seconds.   Neurological:      Mental Status: She is alert and oriented to person, place, and time.         Assessment:       1. Left-sided back pain, unspecified back location, unspecified chronicity    2. Diarrhea, unspecified type        Plan:       Left-sided back pain, unspecified back location, unspecified chronicity  X-ray to rule out constipation with referred pain  Consider EGD/Colonoscopy if symptoms do not improve  -     X-Ray Abdomen AP 1 View; Future; Expected date: 05/23/2025    Diarrhea, unspecified type  Stool studies and x-ray to rule out any bacterial/constipation causes.    Consider repeat colonoscopy and/or EGD with Dr Frank if symptoms do not improve.  -     X-Ray Abdomen AP 1 View; Future; Expected date: 05/23/2025  -     Pancreatic elastase, fecal; Future; Expected date: 05/23/2025  -     Giardia / Cryptosporidum, EIA; Future; Expected date: 05/23/2025  -     Stool Exam-Ova,Cysts,Parasites; Future; Expected date: 05/23/2025  -     Clostridium difficile EIA; Future; Expected date: 05/23/2025  -     Stool culture; Future; Expected date: 05/23/2025      No follow-ups on file.      If no improvement in symptoms or symptoms worsen, call/follow-up at clinic or go to ER.       CHANDLER Gao, SUMAYAP-C    Encounter includes face to face time and non-face to face time preparing to see the patient (eg, review of tests), obtaining and/or reviewing separately obtained history, documenting clinical information in the electronic or other health record, independently interpreting results (not separately reported) and communicating results to the  patient/family/caregiver, or care coordination (not separately reported).     A dictation software program was used for this note. Please expect some simple typographical errors in this note.         [1]   Social History  Tobacco Use    Smoking status: Never    Smokeless tobacco: Never   Substance Use Topics    Alcohol use: Yes     Alcohol/week: 1.0 standard drink of alcohol     Types: 1 Glasses of wine per week    Drug use: No

## 2025-05-26 ENCOUNTER — TELEPHONE (OUTPATIENT)
Dept: GASTROENTEROLOGY | Facility: CLINIC | Age: 70
End: 2025-05-26
Payer: COMMERCIAL

## 2025-05-26 ENCOUNTER — NURSE TRIAGE (OUTPATIENT)
Dept: ADMINISTRATIVE | Facility: CLINIC | Age: 70
End: 2025-05-26
Payer: COMMERCIAL

## 2025-05-26 PROBLEM — K92.2 GIB (GASTROINTESTINAL BLEEDING): Status: ACTIVE | Noted: 2025-05-26

## 2025-05-26 PROBLEM — G89.29 ACUTE ON CHRONIC LOW BACK PAIN: Status: ACTIVE | Noted: 2025-05-26

## 2025-05-26 PROBLEM — M54.50 ACUTE ON CHRONIC LOW BACK PAIN: Status: ACTIVE | Noted: 2025-05-26

## 2025-05-26 PROBLEM — R93.3 ABNORMAL CT SCAN, COLON: Status: ACTIVE | Noted: 2025-05-26

## 2025-05-26 PROBLEM — K92.1 HEMATOCHEZIA: Status: ACTIVE | Noted: 2025-05-26

## 2025-05-26 NOTE — TELEPHONE ENCOUNTER
"Pt called with c/o rectal bleeding that started about 4 hours ago and stated she had about 3 episodes of BRB with no stool. Care advice recommends go to ED now. Pt verbalized understanding.  Reason for Disposition   [1] MODERATE rectal bleeding (e.g., small blood clots, passing blood without stool, or toilet water turns red) AND [2] more than once a day    Additional Information   Negative: Shock suspected (e.g., cold/pale/clammy skin, too weak to stand, low BP, rapid pulse)   Negative: Difficult to awaken or acting confused (e.g., disoriented, slurred speech)   Negative: Passed out (e.g., fainted, lost consciousness, blacked out and was not responding)   Negative: [1] Vomiting AND [2] contains red blood or black ("coffee ground") material  (Exception: Few red streaks in vomit that only happened once.)   Negative: Sounds like a life-threatening emergency to the triager   Negative: SEVERE rectal bleeding (e.g., large blood clots; constant or on and off bleeding)   Negative: SEVERE dizziness (e.g., unable to stand, requires support to walk, feels like passing out now)    Protocols used: Rectal Bleeding-A-AH    "

## 2025-05-26 NOTE — TELEPHONE ENCOUNTER
----- Message from Shalonda sent at 5/26/2025 11:45 AM CDT -----  Type:  Needs Medical AdviceWho Called: pt Symptoms (please be specific): GI Bleed How long has patient had these symptoms:  pt unsureWould the patient rather a call back or a response via [facility]? Please callBe Call Back Number:  [phone] Additional Information: pt is in [facility] since 1 am   Please call back to advise. Thanks!

## 2025-05-27 ENCOUNTER — TELEPHONE (OUTPATIENT)
Dept: GASTROENTEROLOGY | Facility: CLINIC | Age: 70
End: 2025-05-27
Payer: COMMERCIAL

## 2025-05-27 NOTE — TELEPHONE ENCOUNTER
----- Message from Mahesh sent at 5/27/2025  4:25 PM CDT -----  Type:  Needs Medical AdviceWho Called: ptWould the patient rather a call back or a response via [facility]? Call backBest Call Back Number: [phone] Additional Information: pt st she is in [facility] & had a prodecure done. Pt st the dr who did the procedure didn't give her any info regarding results or info regarding procedure. Pt st she would like dr bowers staff to give her a call. please call to discuss  ----- Message -----  From: Mahesh Salgado  Sent: 5/27/2025   4:27 PM CDT  To: Zac ENAMORADO Jr. Staff    Type:  Needs Medical AdviceWho Called: ptWould the patient rather a call back or a response via [facility]? Call [facility] Call Back Number: [phone] Additional Information: [place] & [place] prodecure done. Pt st the dr who did the procedure didn't give her any info regarding results or info regarding procedure. Pt st she would like dr bowers staff to give her a call. please call to discuss

## 2025-05-29 ENCOUNTER — RESULTS FOLLOW-UP (OUTPATIENT)
Dept: GASTROENTEROLOGY | Facility: CLINIC | Age: 70
End: 2025-05-29

## 2025-05-29 ENCOUNTER — TELEPHONE (OUTPATIENT)
Dept: GASTROENTEROLOGY | Facility: CLINIC | Age: 70
End: 2025-05-29
Payer: COMMERCIAL

## 2025-05-29 NOTE — TELEPHONE ENCOUNTER
----- Message from Alida sent at 5/29/2025  8:16 AM CDT -----  Type:  Appointment RequestName of Caller: pt When is the first available appointment? None pulling Symptoms: hospital follow up for biopsy results Would the patient rather a call back or a response via [facility]? [facility] Call Back Number: [phone] Information: pt was told to make an appointment for 2 weeks out to go over biopsy results  Please call back to advise. Thanks!

## 2025-05-29 NOTE — PROGRESS NOTES
Please notify patient that the biopsies were reviewed.    Stomach biopsies showed no bacteria.      Biopsies of the inflamed area in the colon are consistent with ischemic colitis, as suspected. This indicates that this portion of the colon did not receive an adequate blood supply at some point in time. This could possibly be due to low blood pressure, dehydration, or medications.    Continue current meds and follow up as previously planned.

## 2025-05-29 NOTE — TELEPHONE ENCOUNTER
Called and spoke to pt, pt stated Dr. Frank saw her in hospital yesterday. Pt wanting to schedule 2 week follow up with Dr. Frank to go over path. Advised pt Dr. Frank does not have any appts in 2 weeks, but as soon as pathology is reviewed, we will give her phone call or  may call pt. Pt verbalized understanding.

## 2025-05-30 ENCOUNTER — TELEPHONE (OUTPATIENT)
Dept: GASTROENTEROLOGY | Facility: CLINIC | Age: 70
End: 2025-05-30
Payer: COMMERCIAL

## 2025-05-30 PROBLEM — E66.01 CLASS 2 SEVERE OBESITY DUE TO EXCESS CALORIES WITH SERIOUS COMORBIDITY AND BODY MASS INDEX (BMI) OF 39.0 TO 39.9 IN ADULT: Status: ACTIVE | Noted: 2025-05-30

## 2025-05-30 PROBLEM — I10 ESSENTIAL HYPERTENSION: Status: ACTIVE | Noted: 2025-05-30

## 2025-05-30 PROBLEM — E66.812 CLASS 2 SEVERE OBESITY DUE TO EXCESS CALORIES WITH SERIOUS COMORBIDITY AND BODY MASS INDEX (BMI) OF 39.0 TO 39.9 IN ADULT: Status: ACTIVE | Noted: 2025-05-30

## 2025-05-30 PROBLEM — K55.9 ISCHEMIC COLITIS: Status: ACTIVE | Noted: 2025-05-30

## 2025-05-30 NOTE — TELEPHONE ENCOUNTER
"Pt requests to cancel appointment in [place] and wait for Dr Frank and [facility]. Advised her that the appointment is not until September and she verbalized understanding and said "I will just wait for a cancellation or come back to the hospital if anything happens before then." Attempted to offer NP appointment with Vi since she wanted to be seen in [place] but she refused at this time.   "

## 2025-06-11 ENCOUNTER — TELEPHONE (OUTPATIENT)
Dept: GASTROENTEROLOGY | Facility: CLINIC | Age: 70
End: 2025-06-11
Payer: COMMERCIAL

## 2025-06-11 NOTE — TELEPHONE ENCOUNTER
Spoke with pt. Pt states she is a  & not able to see Dr. Frank in clinic given his clinic times. Pt rescheduled to see NP for month of Sept as pt will not have insurance month of August due to her changing insurances. Pt verbalized understanding to all.

## 2025-06-11 NOTE — TELEPHONE ENCOUNTER
Copied from CRM #[id number]. Topic: Appointments - Appointment Access  >> Jun 11, 2025  9:59 AM Alida wrote:  Type: Apoointment Request    Name of Caller:pt     When is the first available appointment?September 4     Symptoms:follow up     Would the patient rather a call back or a response via [facility]? 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 They are asking to speak with someone about getting a different appointment time during the day